# Patient Record
Sex: MALE | Race: WHITE | NOT HISPANIC OR LATINO | Employment: FULL TIME | URBAN - METROPOLITAN AREA
[De-identification: names, ages, dates, MRNs, and addresses within clinical notes are randomized per-mention and may not be internally consistent; named-entity substitution may affect disease eponyms.]

---

## 2017-01-09 ENCOUNTER — DOCUMENTATION ONLY (OUTPATIENT)
Dept: REHABILITATION | Facility: HOSPITAL | Age: 42
End: 2017-01-09

## 2017-01-09 NOTE — PROGRESS NOTES
PHYSICAL THERAPY DISCHARGE SUMMARY     Name: Cayden Reza  Clinic Number: [id number]    Diagnosis: Lumbar pain with radiation down left leg, difficulty walking  Physician: Remy Feng  Treatment Orders: Discharge Summary  Past Medical History   Diagnosis Date    Synovitis of hip      right       Initial visit: 9/26/16  Date of Last visit: 9/26/16  Date of Discharge Note:  1/9/17  Total Visits Received:1  Missed Visits: 3  ASSESSMENT   Status Towards Goals Met:  Patient did not return to physical therapy.  Pt is discharged prior to achieving the goals established in the initial evaluation due to discontinuing physical therapy.     Goals Not achieved and why: see above    Discharge reason : Pt has not re-scheduled further follow-up sessions    PLAN   This patient is discharged from Physical Therapy Services.     Medical necessity is demonstrated by the following IMPAIRMENTS/PROBLEM LIST:  1) Pain limiting function  2) Posture dysfunction  3) Core/Lumbar/LE weakness  4) Decreased thoracic/lumbar joint mobility  5) Decreased Lumbar ROM  6) Decreased soft tissue extensibility/fascia restriction  7) Decreased LE flexibility: HS: left greater than right; decreased sciatic nerve gliding bilaterally left greater than right  8) Lack of HEP  9) LLE paresthesia to left foot  10) pelvic dysfunction: right anterior ilial rotation and left posterior ilial rotation; right sacral rotation     GOALS:   Short Term Goals: 4 weeks  1. Report decreased lumbar pain </= 5/10 at worst to increase tolerance for prolonged standing/community ambulation  2. Pt. to demonstrate proper cervical and scapula retraction requiring min. to no verbal cues from PT  3. Increased MMT for core/lumbar paraspinals to 3+/5 to increase endurance with prolonged sitting/standing.  4. Pt. to demonstrate increased MMT for right gluteus medius to 3/5 to increase stability during community ambulation  5. Increased MMT for hip flexor to 4+/5 to increase  ability to stair negotiation/toe clearance.   6. Pt to tolerate HEP to improve ROM and independence with ADL's  7. Pt. to report a decrease in left LE paresthesia by >/= 50% to improve ability to perform community ambulation     Long Term Goals: 8 weeks  1. Report decreased lumbar pain </= 2/10 at worst to increase tolerance for work related activities  2. Pt. to demonstrate proper cervical and scapula retraction requiring no verbal cues from PT  3. Increase thoracic joint mobility to 3/6 to promote greater ease with self care skills  4. Increase lumbosacral joint mobility to 3-/6 to promote greater ease with prolonged sitting/standing  5. Increased MMT for core/lumbar paraspinals to 4-/5 to increase endurance with prolonged sitting.   6. Pt. to demonstrate increased MMT for right gluteus medius to 4+/5 to increase stability during ambulation on uneven surfaces.  7. Increased MMT for hip flexor to 5/5 to increase tolerance for ADL and work activities.   8. Pt to be independent with HEP to improve ROM and independence with ADL's  9. Pt. to report a decrease in left LE paresthesia by >/= 75% to improve ability to perform work related activities

## 2017-01-30 RX ORDER — MELOXICAM 15 MG/1
TABLET ORAL
Qty: 30 TABLET | Refills: 0 | Status: SHIPPED | OUTPATIENT
Start: 2017-01-30 | End: 2017-02-06 | Stop reason: SDUPTHER

## 2017-02-06 ENCOUNTER — TELEPHONE (OUTPATIENT)
Dept: ORTHOPEDICS | Facility: CLINIC | Age: 42
End: 2017-02-06

## 2017-02-06 DIAGNOSIS — M54.50 ACUTE RIGHT-SIDED LOW BACK PAIN WITHOUT SCIATICA: Primary | ICD-10-CM

## 2017-02-06 RX ORDER — MELOXICAM 15 MG/1
TABLET ORAL
Qty: 30 TABLET | Refills: 0 | Status: SHIPPED | OUTPATIENT
Start: 2017-02-06 | End: 2017-03-03

## 2017-02-06 NOTE — TELEPHONE ENCOUNTER
Pt notified that requested  Mobic RX refill has been ordered per KAT Yan NP. Pt states understanding.

## 2017-02-06 NOTE — TELEPHONE ENCOUNTER
----- Message from Bri Romero sent at 2/6/2017  9:59 AM CST -----  Contact: [facility]@ [phone]  Pt would like a refill for rx meloxicam (MOBIC) 15 MG tablet.

## 2017-02-07 RX ORDER — MELOXICAM 15 MG/1
TABLET ORAL
Qty: 30 TABLET | Refills: 0 | Status: SHIPPED | OUTPATIENT
Start: 2017-02-07 | End: 2017-03-03 | Stop reason: SDUPTHER

## 2017-03-03 RX ORDER — MELOXICAM 15 MG/1
TABLET ORAL
Qty: 30 TABLET | Refills: 3 | Status: SHIPPED | OUTPATIENT
Start: 2017-03-03 | End: 2017-04-04 | Stop reason: SDUPTHER

## 2017-04-02 DIAGNOSIS — M54.50 ACUTE RIGHT-SIDED LOW BACK PAIN WITHOUT SCIATICA: ICD-10-CM

## 2017-04-03 RX ORDER — MELOXICAM 15 MG/1
TABLET ORAL
Qty: 30 TABLET | Refills: 0 | OUTPATIENT
Start: 2017-04-03

## 2017-04-04 RX ORDER — MELOXICAM 15 MG/1
TABLET ORAL
Qty: 30 TABLET | Refills: 3 | Status: SHIPPED | OUTPATIENT
Start: 2017-04-04

## 2017-04-21 RX ORDER — TRAMADOL HYDROCHLORIDE 50 MG/1
50 TABLET ORAL
Qty: 90 TABLET | Refills: 0 | Status: SHIPPED | OUTPATIENT
Start: 2017-04-21 | End: 2017-05-01

## 2017-06-26 RX ORDER — AZITHROMYCIN 250 MG/1
TABLET, FILM COATED ORAL
Qty: 6 TABLET | Refills: 0 | Status: SHIPPED | OUTPATIENT
Start: 2017-06-26 | End: 2017-07-01

## 2020-04-06 ENCOUNTER — PATIENT MESSAGE (OUTPATIENT)
Dept: SPORTS MEDICINE | Facility: CLINIC | Age: 45
End: 2020-04-06

## 2021-04-16 ENCOUNTER — PATIENT MESSAGE (OUTPATIENT)
Dept: RESEARCH | Facility: HOSPITAL | Age: 46
End: 2021-04-16

## 2024-05-16 ENCOUNTER — PATIENT MESSAGE (OUTPATIENT)
Dept: PRIMARY CARE CLINIC | Facility: CLINIC | Age: 49
End: 2024-05-16

## 2024-05-16 ENCOUNTER — PATIENT MESSAGE (OUTPATIENT)
Dept: PRIMARY CARE CLINIC | Facility: CLINIC | Age: 49
End: 2024-05-16
Payer: COMMERCIAL

## 2024-05-16 ENCOUNTER — LAB VISIT (OUTPATIENT)
Dept: LAB | Facility: HOSPITAL | Age: 49
End: 2024-05-16
Attending: STUDENT IN AN ORGANIZED HEALTH CARE EDUCATION/TRAINING PROGRAM
Payer: COMMERCIAL

## 2024-05-16 ENCOUNTER — OFFICE VISIT (OUTPATIENT)
Dept: PRIMARY CARE CLINIC | Facility: CLINIC | Age: 49
End: 2024-05-16
Payer: COMMERCIAL

## 2024-05-16 VITALS
SYSTOLIC BLOOD PRESSURE: 122 MMHG | BODY MASS INDEX: 29.86 KG/M2 | OXYGEN SATURATION: 98 % | HEART RATE: 65 BPM | HEIGHT: 68 IN | DIASTOLIC BLOOD PRESSURE: 80 MMHG | WEIGHT: 197.06 LBS

## 2024-05-16 DIAGNOSIS — Z12.11 ENCOUNTER FOR COLORECTAL CANCER SCREENING: ICD-10-CM

## 2024-05-16 DIAGNOSIS — Z13.1 SCREENING FOR DIABETES MELLITUS: ICD-10-CM

## 2024-05-16 DIAGNOSIS — E34.9 TESTOSTERONE DEFICIENCY: Primary | ICD-10-CM

## 2024-05-16 DIAGNOSIS — Z00.00 ANNUAL PHYSICAL EXAM: Primary | ICD-10-CM

## 2024-05-16 DIAGNOSIS — Z00.00 ANNUAL PHYSICAL EXAM: ICD-10-CM

## 2024-05-16 DIAGNOSIS — Z12.12 ENCOUNTER FOR COLORECTAL CANCER SCREENING: ICD-10-CM

## 2024-05-16 DIAGNOSIS — E78.5 HYPERLIPIDEMIA, UNSPECIFIED HYPERLIPIDEMIA TYPE: ICD-10-CM

## 2024-05-16 DIAGNOSIS — Z12.5 PROSTATE CANCER SCREENING: ICD-10-CM

## 2024-05-16 DIAGNOSIS — K21.9 GASTROESOPHAGEAL REFLUX DISEASE, UNSPECIFIED WHETHER ESOPHAGITIS PRESENT: ICD-10-CM

## 2024-05-16 DIAGNOSIS — I10 HTN (HYPERTENSION), BENIGN: ICD-10-CM

## 2024-05-16 LAB
ALBUMIN SERPL BCP-MCNC: 4.4 G/DL (ref 3.5–5.2)
ALP SERPL-CCNC: 49 U/L (ref 55–135)
ALT SERPL W/O P-5'-P-CCNC: 52 U/L (ref 10–44)
ANION GAP SERPL CALC-SCNC: 7 MMOL/L (ref 8–16)
AST SERPL-CCNC: 26 U/L (ref 10–40)
BASOPHILS # BLD AUTO: 0.06 K/UL (ref 0–0.2)
BASOPHILS NFR BLD: 0.9 % (ref 0–1.9)
BILIRUB SERPL-MCNC: 0.7 MG/DL (ref 0.1–1)
BUN SERPL-MCNC: 21 MG/DL (ref 6–20)
CALCIUM SERPL-MCNC: 10.3 MG/DL (ref 8.7–10.5)
CHLORIDE SERPL-SCNC: 104 MMOL/L (ref 95–110)
CHOLEST SERPL-MCNC: 228 MG/DL (ref 120–199)
CHOLEST/HDLC SERPL: 4.2 {RATIO} (ref 2–5)
CO2 SERPL-SCNC: 28 MMOL/L (ref 23–29)
COMPLEXED PSA SERPL-MCNC: 0.98 NG/ML (ref 0–4)
CREAT SERPL-MCNC: 1.5 MG/DL (ref 0.5–1.4)
DIFFERENTIAL METHOD BLD: ABNORMAL
EOSINOPHIL # BLD AUTO: 0.2 K/UL (ref 0–0.5)
EOSINOPHIL NFR BLD: 2.4 % (ref 0–8)
ERYTHROCYTE [DISTWIDTH] IN BLOOD BY AUTOMATED COUNT: 12.9 % (ref 11.5–14.5)
EST. GFR  (NO RACE VARIABLE): 56.4 ML/MIN/1.73 M^2
ESTIMATED AVG GLUCOSE: 108 MG/DL (ref 68–131)
GLUCOSE SERPL-MCNC: 91 MG/DL (ref 70–110)
HBA1C MFR BLD: 5.4 % (ref 4–5.6)
HCT VFR BLD AUTO: 47 % (ref 40–54)
HCV AB SERPL QL IA: NORMAL
HDLC SERPL-MCNC: 54 MG/DL (ref 40–75)
HDLC SERPL: 23.7 % (ref 20–50)
HGB BLD-MCNC: 15.7 G/DL (ref 14–18)
HIV 1+2 AB+HIV1 P24 AG SERPL QL IA: NORMAL
IMM GRANULOCYTES # BLD AUTO: 0.03 K/UL (ref 0–0.04)
IMM GRANULOCYTES NFR BLD AUTO: 0.4 % (ref 0–0.5)
LDLC SERPL CALC-MCNC: 142.2 MG/DL (ref 63–159)
LYMPHOCYTES # BLD AUTO: 2.7 K/UL (ref 1–4.8)
LYMPHOCYTES NFR BLD: 38.5 % (ref 18–48)
MCH RBC QN AUTO: 32.1 PG (ref 27–31)
MCHC RBC AUTO-ENTMCNC: 33.4 G/DL (ref 32–36)
MCV RBC AUTO: 96 FL (ref 82–98)
MONOCYTES # BLD AUTO: 0.7 K/UL (ref 0.3–1)
MONOCYTES NFR BLD: 9.4 % (ref 4–15)
NEUTROPHILS # BLD AUTO: 3.4 K/UL (ref 1.8–7.7)
NEUTROPHILS NFR BLD: 48.4 % (ref 38–73)
NONHDLC SERPL-MCNC: 174 MG/DL
NRBC BLD-RTO: 0 /100 WBC
PLATELET # BLD AUTO: 218 K/UL (ref 150–450)
PMV BLD AUTO: 10.5 FL (ref 9.2–12.9)
POTASSIUM SERPL-SCNC: 4.6 MMOL/L (ref 3.5–5.1)
PROT SERPL-MCNC: 7.8 G/DL (ref 6–8.4)
RBC # BLD AUTO: 4.89 M/UL (ref 4.6–6.2)
SODIUM SERPL-SCNC: 139 MMOL/L (ref 136–145)
TRIGL SERPL-MCNC: 159 MG/DL (ref 30–150)
TSH SERPL DL<=0.005 MIU/L-ACNC: 1.08 UIU/ML (ref 0.4–4)
WBC # BLD AUTO: 7.01 K/UL (ref 3.9–12.7)

## 2024-05-16 PROCEDURE — 87389 HIV-1 AG W/HIV-1&-2 AB AG IA: CPT | Performed by: STUDENT IN AN ORGANIZED HEALTH CARE EDUCATION/TRAINING PROGRAM

## 2024-05-16 PROCEDURE — 3079F DIAST BP 80-89 MM HG: CPT | Mod: CPTII,S$GLB,, | Performed by: STUDENT IN AN ORGANIZED HEALTH CARE EDUCATION/TRAINING PROGRAM

## 2024-05-16 PROCEDURE — 36415 COLL VENOUS BLD VENIPUNCTURE: CPT | Performed by: STUDENT IN AN ORGANIZED HEALTH CARE EDUCATION/TRAINING PROGRAM

## 2024-05-16 PROCEDURE — 3074F SYST BP LT 130 MM HG: CPT | Mod: CPTII,S$GLB,, | Performed by: STUDENT IN AN ORGANIZED HEALTH CARE EDUCATION/TRAINING PROGRAM

## 2024-05-16 PROCEDURE — 80061 LIPID PANEL: CPT | Performed by: STUDENT IN AN ORGANIZED HEALTH CARE EDUCATION/TRAINING PROGRAM

## 2024-05-16 PROCEDURE — 80053 COMPREHEN METABOLIC PANEL: CPT | Performed by: STUDENT IN AN ORGANIZED HEALTH CARE EDUCATION/TRAINING PROGRAM

## 2024-05-16 PROCEDURE — 3008F BODY MASS INDEX DOCD: CPT | Mod: CPTII,S$GLB,, | Performed by: STUDENT IN AN ORGANIZED HEALTH CARE EDUCATION/TRAINING PROGRAM

## 2024-05-16 PROCEDURE — 1159F MED LIST DOCD IN RCRD: CPT | Mod: CPTII,S$GLB,, | Performed by: STUDENT IN AN ORGANIZED HEALTH CARE EDUCATION/TRAINING PROGRAM

## 2024-05-16 PROCEDURE — 86803 HEPATITIS C AB TEST: CPT | Performed by: STUDENT IN AN ORGANIZED HEALTH CARE EDUCATION/TRAINING PROGRAM

## 2024-05-16 PROCEDURE — 83036 HEMOGLOBIN GLYCOSYLATED A1C: CPT | Performed by: STUDENT IN AN ORGANIZED HEALTH CARE EDUCATION/TRAINING PROGRAM

## 2024-05-16 PROCEDURE — 1160F RVW MEDS BY RX/DR IN RCRD: CPT | Mod: CPTII,S$GLB,, | Performed by: STUDENT IN AN ORGANIZED HEALTH CARE EDUCATION/TRAINING PROGRAM

## 2024-05-16 PROCEDURE — 85025 COMPLETE CBC W/AUTO DIFF WBC: CPT | Performed by: STUDENT IN AN ORGANIZED HEALTH CARE EDUCATION/TRAINING PROGRAM

## 2024-05-16 PROCEDURE — 84443 ASSAY THYROID STIM HORMONE: CPT | Performed by: STUDENT IN AN ORGANIZED HEALTH CARE EDUCATION/TRAINING PROGRAM

## 2024-05-16 PROCEDURE — 4010F ACE/ARB THERAPY RXD/TAKEN: CPT | Mod: CPTII,S$GLB,, | Performed by: STUDENT IN AN ORGANIZED HEALTH CARE EDUCATION/TRAINING PROGRAM

## 2024-05-16 PROCEDURE — 99999 PR PBB SHADOW E&M-EST. PATIENT-LVL V: CPT | Mod: PBBFAC,,, | Performed by: STUDENT IN AN ORGANIZED HEALTH CARE EDUCATION/TRAINING PROGRAM

## 2024-05-16 PROCEDURE — 99386 PREV VISIT NEW AGE 40-64: CPT | Mod: S$GLB,,, | Performed by: STUDENT IN AN ORGANIZED HEALTH CARE EDUCATION/TRAINING PROGRAM

## 2024-05-16 PROCEDURE — 3044F HG A1C LEVEL LT 7.0%: CPT | Mod: CPTII,S$GLB,, | Performed by: STUDENT IN AN ORGANIZED HEALTH CARE EDUCATION/TRAINING PROGRAM

## 2024-05-16 PROCEDURE — 84153 ASSAY OF PSA TOTAL: CPT | Performed by: STUDENT IN AN ORGANIZED HEALTH CARE EDUCATION/TRAINING PROGRAM

## 2024-05-16 RX ORDER — ROSUVASTATIN CALCIUM 10 MG/1
1 TABLET, COATED ORAL DAILY
COMMUNITY

## 2024-05-16 RX ORDER — BENAZEPRIL HYDROCHLORIDE 40 MG/1
40 TABLET ORAL DAILY
COMMUNITY
Start: 2024-01-27 | End: 2024-06-19 | Stop reason: SDUPTHER

## 2024-05-16 NOTE — TELEPHONE ENCOUNTER
Pt was seen in clinic today and states he didn't emntion he is on testosterone supplement. Pt would like this checked

## 2024-05-16 NOTE — PROGRESS NOTES
SUBJECTIVE     Chief Complaint   Patient presents with    Establish Care       HPI  Cayden Reza is a 49 y.o. male with medical diagnoses as listed in the medical history and problem list that presents for annual exam. Pt is establishing care with me today.    Pt is not UTD on age appropriate CA screening. Due for PSA.    Family, social, surgical Hx reviewed     HTN: benazepril 40 mg daily, out of meds for 10 days. BP in clinic today is 122/80.     HLD: Crestor 10 mg daily, tolerating without issues.     GERD: Omeprazole prn reflux. No blood in stool.       Health Maintenance         Date Due Completion Date    TETANUS VACCINE Never done ---    Colorectal Cancer Screening Never done ---    COVID-19 Vaccine ( season) Never done ---    Shingles Vaccine (1 of 2) Never done ---    Influenza Vaccine (Season Ended) 2024 ---    Hemoglobin A1c (Diabetic Prevention Screening) 2027    Lipid Panel 2029              PAST MEDICAL HISTORY:  Past Medical History:   Diagnosis Date    HTN (hypertension), benign 2024    Synovitis of hip     right       PAST SURGICAL HISTORY:  Past Surgical History:   Procedure Laterality Date    HIP SURGERY      bilateral hip       SOCIAL HISTORY:  Social History     Socioeconomic History    Marital status:    Tobacco Use    Smoking status: Former     Current packs/day: 0.00     Types: Cigarettes     Quit date: 1996     Years since quittin.9    Smokeless tobacco: Never   Substance and Sexual Activity    Alcohol use: Yes     Comment: socially    Drug use: No       FAMILY HISTORY:  Family History   Problem Relation Name Age of Onset    No Known Problems Mother      Prostate cancer Father      Ulcerative colitis Sister      Transient ischemic attack Maternal Grandmother      Pacemaker/defibrilator Maternal Grandfather      Heart disease Paternal Grandfather      Prostate cancer Maternal Uncle      Stomach cancer Neg Hx       "Esophageal cancer Neg Hx      Irritable bowel syndrome Neg Hx      Crohn's disease Neg Hx      Celiac disease Neg Hx         ALLERGIES AND MEDICATIONS: updated and reviewed.  Review of patient's allergies indicates:   Allergen Reactions    Adhesive Other (See Comments)     BURNING    Amoxicillin Nausea And Vomiting    Morphine Other (See Comments)    Oxycodone-acetaminophen Other (See Comments)     Current Outpatient Medications   Medication Sig Dispense Refill    benazepriL (LOTENSIN) 40 MG tablet Take 40 mg by mouth once daily.      rosuvastatin (CRESTOR) 10 MG tablet Take 1 tablet by mouth once daily.       No current facility-administered medications for this visit.       ROS  Review of Systems   Constitutional:  Negative for fever and weight loss.   Respiratory:  Negative for cough and shortness of breath.    Cardiovascular:  Negative for chest pain and palpitations.   Gastrointestinal:  Negative for abdominal pain, constipation, diarrhea, nausea and vomiting.   Genitourinary:  Negative for dysuria.   Musculoskeletal:  Negative for back pain and joint pain.   Skin:  Negative for rash.   Neurological:  Negative for dizziness, weakness and headaches.   Psychiatric/Behavioral:  Negative for depression. The patient is not nervous/anxious.            OBJECTIVE     Physical Exam  Vitals:    05/16/24 0852   BP: 122/80   Pulse: 65    Body mass index is 29.97 kg/m².  Weight: 89.4 kg (197 lb 1.5 oz)   Height: 5' 8" (172.7 cm)     Physical Exam  HENT:      Head: Normocephalic and atraumatic.      Nose: Nose normal.      Mouth/Throat:      Mouth: Mucous membranes are moist.      Pharynx: Oropharynx is clear.   Eyes:      Extraocular Movements: Extraocular movements intact.      Conjunctiva/sclera: Conjunctivae normal.      Pupils: Pupils are equal, round, and reactive to light.   Pulmonary:      Effort: Pulmonary effort is normal.   Musculoskeletal:         General: No swelling. Normal range of motion.      Cervical back: " Normal range of motion.      Right lower leg: No edema.      Left lower leg: No edema.   Skin:     General: Skin is warm.      Findings: No lesion or rash.   Neurological:      General: No focal deficit present.      Mental Status: He is alert and oriented to person, place, and time.      Motor: No weakness.               ASSESSMENT     49 y.o. male with     1. Annual physical exam    2. HTN (hypertension), benign    3. Prostate cancer screening    4. Screening for diabetes mellitus    5. Hyperlipidemia, unspecified hyperlipidemia type    6. Encounter for colorectal cancer screening    7. Gastroesophageal reflux disease, unspecified whether esophagitis present        PLAN:     1. Annual physical exam  -     HIV 1/2 Ag/Ab (4th Gen); Future; Expected date: 05/16/2024  -     Hepatitis C Antibody; Future; Expected date: 05/16/2024  -     TSH; Future; Expected date: 05/16/2024  -     Lipid Panel; Future; Expected date: 05/16/2024  -     Comprehensive Metabolic Panel; Future; Expected date: 05/16/2024  -     CBC Auto Differential; Future; Expected date: 05/16/2024    2. HTN (hypertension), benign  Stable on medications, continue regimen    3. Prostate cancer screening  -     PSA, Screening; Future; Expected date: 05/16/2024    4. Screening for diabetes mellitus  -     Hemoglobin A1C; Future; Expected date: 05/16/2024    5. Hyperlipidemia, unspecified hyperlipidemia type  Stable on medications, continue regimen    6. Encounter for colorectal cancer screening  -     Ambulatory referral/consult to Endo Procedure ; Future; Expected date: 05/17/2024    7. Gastroesophageal reflux disease, unspecified whether esophagitis present  -     Ambulatory referral/consult to Gastroenterology; Future; Expected date: 05/23/2024        Discussed age and gender appropriate screenings at this visit and encouraged a healthy diet low in simple carbohydrates, and increased physical activity.  Counseled on medically appropriate vaccines  based on age and current health condition.  Screening test reviewed and discussed with patient.      RTC in 1 year     Leanne Stanley MD

## 2024-05-16 NOTE — PATIENT INSTRUCTIONS
Endoscopy  Phone Number: [phone] (Call and leave a voicemail with name, phone number, and date of birth)

## 2024-05-17 DIAGNOSIS — E34.9 TESTOSTERONE INSUFFICIENCY: Primary | ICD-10-CM

## 2024-05-23 ENCOUNTER — LAB VISIT (OUTPATIENT)
Dept: LAB | Facility: HOSPITAL | Age: 49
End: 2024-05-23
Attending: STUDENT IN AN ORGANIZED HEALTH CARE EDUCATION/TRAINING PROGRAM
Payer: COMMERCIAL

## 2024-05-23 DIAGNOSIS — E34.9 TESTOSTERONE INSUFFICIENCY: ICD-10-CM

## 2024-05-23 LAB — TESTOST SERPL-MCNC: 510 NG/DL (ref 304–1227)

## 2024-05-23 PROCEDURE — 84403 ASSAY OF TOTAL TESTOSTERONE: CPT | Performed by: STUDENT IN AN ORGANIZED HEALTH CARE EDUCATION/TRAINING PROGRAM

## 2024-05-23 PROCEDURE — 84402 ASSAY OF FREE TESTOSTERONE: CPT | Performed by: STUDENT IN AN ORGANIZED HEALTH CARE EDUCATION/TRAINING PROGRAM

## 2024-05-23 PROCEDURE — 36415 COLL VENOUS BLD VENIPUNCTURE: CPT | Performed by: STUDENT IN AN ORGANIZED HEALTH CARE EDUCATION/TRAINING PROGRAM

## 2024-05-28 LAB — TESTOST FREE SERPL-MCNC: >100 PG/ML (ref 5.1–41.5)

## 2024-06-14 ENCOUNTER — PATIENT MESSAGE (OUTPATIENT)
Dept: PRIMARY CARE CLINIC | Facility: CLINIC | Age: 49
End: 2024-06-14
Payer: COMMERCIAL

## 2024-06-14 DIAGNOSIS — I10 HTN (HYPERTENSION), BENIGN: Primary | ICD-10-CM

## 2024-06-19 RX ORDER — BENAZEPRIL HYDROCHLORIDE 40 MG/1
40 TABLET ORAL DAILY
Qty: 90 TABLET | Refills: 3 | Status: SHIPPED | OUTPATIENT
Start: 2024-06-19

## 2024-06-19 NOTE — TELEPHONE ENCOUNTER
Pt requesting refill on Belazepril    Med pended for your review   Principal Discharge DX:	Neck pain

## 2024-06-19 NOTE — TELEPHONE ENCOUNTER
No care due was identified.  [facility] Embedded Care Due Messages. Reference number: [id number].   6/19/2024 4:39:17 PM CDT

## 2024-07-12 ENCOUNTER — PATIENT MESSAGE (OUTPATIENT)
Dept: PRIMARY CARE CLINIC | Facility: CLINIC | Age: 49
End: 2024-07-12
Payer: COMMERCIAL

## 2024-07-12 DIAGNOSIS — E78.5 HYPERLIPIDEMIA, UNSPECIFIED HYPERLIPIDEMIA TYPE: Primary | ICD-10-CM

## 2024-07-12 RX ORDER — ROSUVASTATIN CALCIUM 10 MG/1
10 TABLET, COATED ORAL DAILY
Qty: 90 TABLET | Refills: 3 | Status: SHIPPED | OUTPATIENT
Start: 2024-07-12

## 2024-07-12 NOTE — TELEPHONE ENCOUNTER
No care due was identified.  [facility] Embedded Care Due Messages. Reference number: [id number].   7/12/2024 12:45:21 PM CDT

## 2024-07-12 NOTE — TELEPHONE ENCOUNTER
Refill Routing Note   Medication(s) are not appropriate for processing by [facility] for the following reason(s):        No active prescription written by provider:     [facility] action(s):  Defer      Medication Therapy Plan:         Appointments  past 12m or future 3m with PCP    Date Provider   Last Visit   5/16/2024 Leanne Stanley MD   Next Visit   7/29/2024 Leanne Stanley MD   ED visits in past 90 days: 0        Note composed:12:46 PM 07/12/2024

## 2024-07-15 ENCOUNTER — CLINICAL SUPPORT (OUTPATIENT)
Dept: ENDOSCOPY | Facility: HOSPITAL | Age: 49
End: 2024-07-15
Attending: STUDENT IN AN ORGANIZED HEALTH CARE EDUCATION/TRAINING PROGRAM

## 2024-07-15 DIAGNOSIS — Z12.11 ENCOUNTER FOR COLORECTAL CANCER SCREENING: ICD-10-CM

## 2024-07-15 DIAGNOSIS — Z12.12 ENCOUNTER FOR COLORECTAL CANCER SCREENING: ICD-10-CM

## 2024-07-15 NOTE — PLAN OF CARE
Spoke to pt. To schedule. He stated he is changing jobs and not sure when he will be covered by new Insurance. Scheduling phone number provided and he stated he will contact us back when he is ready. My O msg. Sent as well.

## 2024-09-13 ENCOUNTER — HOSPITAL ENCOUNTER (EMERGENCY)
Facility: HOSPITAL | Age: 49
Discharge: HOME | End: 2024-09-13
Attending: EMERGENCY MEDICINE
Payer: COMMERCIAL

## 2024-09-13 VITALS
RESPIRATION RATE: 16 BRPM | DIASTOLIC BLOOD PRESSURE: 86 MMHG | WEIGHT: 195 LBS | TEMPERATURE: 99 F | HEART RATE: 69 BPM | HEIGHT: 68 IN | OXYGEN SATURATION: 99 % | SYSTOLIC BLOOD PRESSURE: 118 MMHG | BODY MASS INDEX: 29.55 KG/M2

## 2024-09-13 DIAGNOSIS — K21.9 GASTROESOPHAGEAL REFLUX DISEASE, UNSPECIFIED WHETHER ESOPHAGITIS PRESENT: ICD-10-CM

## 2024-09-13 DIAGNOSIS — R07.9 CHEST PAIN: Primary | ICD-10-CM

## 2024-09-13 LAB
ALBUMIN SERPL BCP-MCNC: 4.4 G/DL (ref 3.5–5.2)
ALP SERPL-CCNC: 51 U/L (ref 55–135)
ALT SERPL W/O P-5'-P-CCNC: 31 U/L (ref 10–44)
ANION GAP SERPL CALC-SCNC: 9 MMOL/L (ref 8–16)
AST SERPL-CCNC: 18 U/L (ref 10–40)
BASOPHILS # BLD AUTO: 0.05 K/UL (ref 0–0.2)
BASOPHILS NFR BLD: 0.7 % (ref 0–1.9)
BILIRUB SERPL-MCNC: 0.5 MG/DL (ref 0.1–1)
BNP SERPL-MCNC: 10 PG/ML (ref 0–99)
BUN SERPL-MCNC: 21 MG/DL (ref 6–20)
CALCIUM SERPL-MCNC: 10.2 MG/DL (ref 8.7–10.5)
CHLORIDE SERPL-SCNC: 104 MMOL/L (ref 95–110)
CO2 SERPL-SCNC: 26 MMOL/L (ref 23–29)
CREAT SERPL-MCNC: 1.3 MG/DL (ref 0.5–1.4)
DIFFERENTIAL METHOD BLD: ABNORMAL
EOSINOPHIL # BLD AUTO: 0.1 K/UL (ref 0–0.5)
EOSINOPHIL NFR BLD: 0.8 % (ref 0–8)
ERYTHROCYTE [DISTWIDTH] IN BLOOD BY AUTOMATED COUNT: 12.1 % (ref 11.5–14.5)
EST. GFR  (NO RACE VARIABLE): >60 ML/MIN/1.73 M^2
GLUCOSE SERPL-MCNC: 98 MG/DL (ref 70–110)
HCT VFR BLD AUTO: 46.5 % (ref 40–54)
HGB BLD-MCNC: 15.5 G/DL (ref 14–18)
IMM GRANULOCYTES # BLD AUTO: 0.03 K/UL (ref 0–0.04)
IMM GRANULOCYTES NFR BLD AUTO: 0.4 % (ref 0–0.5)
LYMPHOCYTES # BLD AUTO: 2 K/UL (ref 1–4.8)
LYMPHOCYTES NFR BLD: 27.3 % (ref 18–48)
MCH RBC QN AUTO: 31.8 PG (ref 27–31)
MCHC RBC AUTO-ENTMCNC: 33.3 G/DL (ref 32–36)
MCV RBC AUTO: 96 FL (ref 82–98)
MONOCYTES # BLD AUTO: 0.6 K/UL (ref 0.3–1)
MONOCYTES NFR BLD: 8.6 % (ref 4–15)
NEUTROPHILS # BLD AUTO: 4.6 K/UL (ref 1.8–7.7)
NEUTROPHILS NFR BLD: 62.2 % (ref 38–73)
NRBC BLD-RTO: 0 /100 WBC
OHS QRS DURATION: 90 MS
OHS QTC CALCULATION: 412 MS
PLATELET # BLD AUTO: 246 K/UL (ref 150–450)
PMV BLD AUTO: 9.9 FL (ref 9.2–12.9)
POTASSIUM SERPL-SCNC: 4.6 MMOL/L (ref 3.5–5.1)
PROT SERPL-MCNC: 7.7 G/DL (ref 6–8.4)
RBC # BLD AUTO: 4.87 M/UL (ref 4.6–6.2)
SODIUM SERPL-SCNC: 139 MMOL/L (ref 136–145)
TROPONIN I SERPL DL<=0.01 NG/ML-MCNC: <0.006 NG/ML (ref 0–0.03)
TROPONIN I SERPL DL<=0.01 NG/ML-MCNC: <0.006 NG/ML (ref 0–0.03)
WBC # BLD AUTO: 7.32 K/UL (ref 3.9–12.7)

## 2024-09-13 PROCEDURE — 25000003 PHARM REV CODE 250: Performed by: EMERGENCY MEDICINE

## 2024-09-13 PROCEDURE — 83880 ASSAY OF NATRIURETIC PEPTIDE: CPT | Performed by: EMERGENCY MEDICINE

## 2024-09-13 PROCEDURE — 93005 ELECTROCARDIOGRAM TRACING: CPT

## 2024-09-13 PROCEDURE — 80053 COMPREHEN METABOLIC PANEL: CPT | Performed by: EMERGENCY MEDICINE

## 2024-09-13 PROCEDURE — 85025 COMPLETE CBC W/AUTO DIFF WBC: CPT | Performed by: EMERGENCY MEDICINE

## 2024-09-13 PROCEDURE — 84484 ASSAY OF TROPONIN QUANT: CPT | Mod: 91 | Performed by: EMERGENCY MEDICINE

## 2024-09-13 PROCEDURE — 93010 ELECTROCARDIOGRAM REPORT: CPT | Mod: ,,, | Performed by: INTERNAL MEDICINE

## 2024-09-13 PROCEDURE — 99285 EMERGENCY DEPT VISIT HI MDM: CPT | Mod: 25

## 2024-09-13 RX ORDER — NAPROXEN SODIUM 220 MG/1
243 TABLET, FILM COATED ORAL
Status: COMPLETED | OUTPATIENT
Start: 2024-09-13 | End: 2024-09-13

## 2024-09-13 RX ORDER — OMEPRAZOLE 20 MG/1
20 CAPSULE, DELAYED RELEASE ORAL DAILY
Qty: 30 CAPSULE | Refills: 0 | Status: SHIPPED | OUTPATIENT
Start: 2024-09-13 | End: 2024-09-20 | Stop reason: ALTCHOICE

## 2024-09-13 RX ADMIN — ASPIRIN 81 MG CHEWABLE TABLET 243 MG: 81 TABLET CHEWABLE at 11:09

## 2024-09-13 NOTE — ED PROVIDER NOTES
Encounter Date: 9/13/2024       History     Chief Complaint   Patient presents with    Chest Pain     Started this morning tightness in his mid chest and pain to right shoulder radiating to left shoulder    Shortness of Breath     HPI  49-year-old male who presents with chest pain.  Started this morning at around 5:45 a.m..  Did not wake him from sleep.  Was located in the left chest.  Some radiation to the left shoulder and then to the right chest.  Nothing seemed to make it better or worse.  No nausea or vomiting or diaphoresis.  Currently he was having very minimal tightness in the left chest and just feels fatigued.  No weakness or numbness in his extremities.  Reports some mild shortness of breath.  No coughing or fevers.  No leg swelling.  He has a history of hypertension but otherwise no medical problems.  He believes that either his father was brother may have had a heart attack.  He was not a smoker.  He does state that he had a very similar episode a few years ago and he had a full workup including a stress test and was told that it was likely reflux and he would have to have an endoscopy.  Of note, the patient took 1 baby aspirin this morning.      Review of patient's allergies indicates:   Allergen Reactions    Adhesive Other (See Comments)     BURNING    Amoxicillin Nausea And Vomiting    Gabapentin Other (See Comments)     Blurry vision    Morphine Other (See Comments)    Oxycodone-acetaminophen Other (See Comments)     Past Medical History:   Diagnosis Date    HTN (hypertension), benign 5/16/2024    Synovitis of hip     right     Past Surgical History:   Procedure Laterality Date    HIP SURGERY      bilateral hip     Family History   Problem Relation Name Age of Onset    No Known Problems Mother      Prostate cancer Father      Ulcerative colitis Sister      Transient ischemic attack Maternal Grandmother      Pacemaker/defibrilator Maternal Grandfather      Heart disease Paternal Grandfather       Prostate cancer Maternal Uncle      Stomach cancer Neg Hx      Esophageal cancer Neg Hx      Irritable bowel syndrome Neg Hx      Crohn's disease Neg Hx      Celiac disease Neg Hx       Social History     Tobacco Use    Smoking status: Former     Current packs/day: 0.00     Types: Cigarettes     Quit date: 1996     Years since quittin.2    Smokeless tobacco: Never   Substance Use Topics    Alcohol use: Yes     Comment: socially    Drug use: No     Review of Systems    Physical Exam     Initial Vitals [24 1104]   BP Pulse Resp Temp SpO2   (!) 153/99 78 16 97.6 °F (36.4 °C) 98 %      MAP       --         Physical Exam    Nursing note and vitals reviewed.  Constitutional: He appears well-developed and well-nourished. No distress.   HENT:   Head: Normocephalic and atraumatic.   Nose: Nose normal.   Eyes: Conjunctivae and EOM are normal. Pupils are equal, round, and reactive to light.   Neck:   Normal range of motion.  Cardiovascular:  Normal rate, regular rhythm, normal heart sounds and intact distal pulses.     Exam reveals no gallop and no friction rub.       No murmur heard.  Pulmonary/Chest: Breath sounds normal. No respiratory distress. He has no wheezes. He has no rhonchi. He has no rales.   Abdominal: Abdomen is soft. Bowel sounds are normal. He exhibits no distension. There is no abdominal tenderness. There is no rebound and no guarding.   Musculoskeletal:         General: No tenderness or edema. Normal range of motion.      Cervical back: Normal range of motion.     Neurological: He is alert and oriented to person, place, and time. He has normal strength. He displays normal reflexes. No sensory deficit.   Skin: Skin is warm and dry. Capillary refill takes less than 2 seconds.   Psychiatric: He has a normal mood and affect.         ED Course   Procedures  Labs Reviewed   CBC W/ AUTO DIFFERENTIAL - Abnormal       Result Value    WBC 7.32      RBC 4.87      Hemoglobin 15.5      Hematocrit 46.5       MCV 96      MCH 31.8 (*)     MCHC 33.3      RDW 12.1      Platelets 246      MPV 9.9      Immature Granulocytes 0.4      Gran # (ANC) 4.6      Immature Grans (Abs) 0.03      Lymph # 2.0      Mono # 0.6      Eos # 0.1      Baso # 0.05      nRBC 0      Gran % 62.2      Lymph % 27.3      Mono % 8.6      Eosinophil % 0.8      Basophil % 0.7      Differential Method Automated     COMPREHENSIVE METABOLIC PANEL - Abnormal    Sodium 139      Potassium 4.6      Chloride 104      CO2 26      Glucose 98      BUN 21 (*)     Creatinine 1.3      Calcium 10.2      Total Protein 7.7      Albumin 4.4      Total Bilirubin 0.5      Alkaline Phosphatase 51 (*)     AST 18      ALT 31      eGFR >60.0      Anion Gap 9     TROPONIN I    Troponin I <0.006     TROPONIN I    Troponin I <0.006     B-TYPE NATRIURETIC PEPTIDE    BNP 10          ECG Results              EKG 12-lead (Final result)        Collection Time Result Time QRS Duration [facility] QTC Calculation    09/13/24 11:05:14 09/13/24 13:48:56 90 412                     Final result by Interface, Lab In [facility] (09/13/24 13:49:00)                   Narrative:    Test Reason : R07.9,    Vent. Rate : 078 BPM     Atrial Rate : 078 BPM     P-R Int : 168 ms          QRS Dur : 090 ms      QT Int : 362 ms       P-R-T Axes : 031 -10 011 degrees     QTc Int : 412 ms    Normal sinus rhythm  Nonspecific ST and T wave abnormality  Abnormal ECG  No previous ECGs available  Confirmed by AUBREY KNOWLES MD ([id number]) on 9/13/2024 1:48:53 PM    Referred By: SHAVONNE   SELF           Confirmed By:AUBREY KNOWLES MD                                  Imaging Results              X-Ray Chest PA And Lateral (Final result)  Result time 09/13/24 11:56:33      Final result by Jayden Jain MD (09/13/24 11:56:33)                   Impression:      See above      Electronically signed by: Jayden Jain MD  Date:    09/13/2024  Time:    11:56               Narrative:    EXAMINATION:  XR CHEST PA AND  LATERAL    CLINICAL HISTORY:  Chest Pain;    TECHNIQUE:  PA and lateral views of the chest were performed.    COMPARISON:  None    FINDINGS:  Heart size normal.  The lungs are clear.  No pleural effusion                                       Medications   aspirin chewable tablet 243 mg (243 mg Oral Given 9/13/24 1151)     Medical Decision Making  49-year-old male who presents with chest pain.  Only significant history is hypertension though has had similar symptoms in the past with a normal workup.  Here, he is well-appearing.  He was mildly hypertensive to 150/99.  Otherwise he has a normal exam.  EKG without any acute ischemia on my read.  Based on history, certainly we will need to rule out ACS.  Low suspicion for PE, dissection, pericarditis, pneumonia, pancreatitis, cholecystitis.  Certainly could be GERD.  Heart score would be a 342 risk factors, he age, and a moderately suspicious history.    Workup benign.  Stable for discharge.  Return precautions given.    Amount and/or Complexity of Data Reviewed  Labs: ordered.  Radiology: ordered.  ECG/medicine tests: ordered and independent interpretation performed.     Details: Sinus, regular, rate 80, normal intervals, normal ST segments.  Disagree with inferior infarct.  There are some artifactual flutter waves in lead 2 and 3 and AVF but, again, likely artifact.    Risk  OTC drugs.  Prescription drug management.                                      Clinical Impression:  Final diagnoses:  [R07.9] Chest pain (Primary)  [K21.9] Gastroesophageal reflux disease, unspecified whether esophagitis present          ED Disposition Condition    Discharge Stable          ED Prescriptions       Medication Sig Dispense Start Date End Date Auth. Provider    omeprazole (PRILOSEC) 20 MG capsule Take 1 capsule (20 mg total) by mouth once daily. 30 capsule 9/13/2024 10/13/2024 Aracely Mcintosh MD          Follow-up Information    None          Aracely Mcintosh MD  09/13/24 3282

## 2024-09-13 NOTE — ED NOTES
Patient identifiers verified and correct for Cam Alessiavally.  LOC: The patient is awake, alert and aware of environment with an appropriate affect, the patient is oriented x 3 and speaking appropriately.   APPEARANCE: Patient appears comfortable and in no acute distress, patient is clean and well groomed.  SKIN: The skin is warm and dry, color consistent with ethnicity, patient has normal skin turgor and moist mucus membranes, skin intact, no breakdown or bruising noted.   MUSCULOSKELETAL: Patient moving all extremities spontaneously, no swelling noted.  RESPIRATORY: Airway is open and patent, respirations are spontaneous, patient has a normal effort and rate, no accessory muscle use noted, O2 sats noted at 98% on room air.  CARDIAC: Patient has a normal rate and regular rhythm, no edema noted, capillary refill < 3 seconds. Mild chest tightness running into left arm.  GASTRO: Soft and non tender to palpation, no distention noted, normoactive bowel sounds present in all four quadrants. Pt states bowel movements have been regular.  : Pt denies any pain or frequency with urination.  NEURO: Pt opens eyes spontaneously, behavior appropriate to situation, follows commands, facial expression symmetrical, bilateral hand grasp equal and even, purposeful motor response noted, normal sensation in all extremities when touched with a finger.

## 2024-09-13 NOTE — ED TRIAGE NOTES
Pt reports chest pain and tightness going into left shoulder. SOB has resolved. Pt had similar episode about 5 years ago. Hx HTN.

## 2024-09-17 ENCOUNTER — TELEPHONE (OUTPATIENT)
Dept: GASTROENTEROLOGY | Facility: CLINIC | Age: 49
End: 2024-09-17
Payer: COMMERCIAL

## 2024-09-17 NOTE — TELEPHONE ENCOUNTER
MA Attempted to call patient for scheduling, no answer, no voicemail available, MA Sent My chart message.

## 2024-09-18 ENCOUNTER — OFFICE VISIT (OUTPATIENT)
Dept: URGENT CARE | Facility: CLINIC | Age: 49
End: 2024-09-18
Payer: COMMERCIAL

## 2024-09-18 VITALS
TEMPERATURE: 98 F | RESPIRATION RATE: 18 BRPM | DIASTOLIC BLOOD PRESSURE: 77 MMHG | HEART RATE: 62 BPM | WEIGHT: 195 LBS | OXYGEN SATURATION: 98 % | BODY MASS INDEX: 29.55 KG/M2 | HEIGHT: 68 IN | SYSTOLIC BLOOD PRESSURE: 114 MMHG

## 2024-09-18 DIAGNOSIS — T78.40XA ALLERGIC REACTION, INITIAL ENCOUNTER: Primary | ICD-10-CM

## 2024-09-18 PROCEDURE — 99213 OFFICE O/P EST LOW 20 MIN: CPT | Mod: S$GLB,,, | Performed by: FAMILY MEDICINE

## 2024-09-18 RX ORDER — MOMETASONE FUROATE 1 MG/G
CREAM TOPICAL DAILY
Qty: 45 G | Refills: 0 | Status: SHIPPED | OUTPATIENT
Start: 2024-09-18

## 2024-09-18 NOTE — PROGRESS NOTES
"Subjective:      Patient ID: Cayden Reza is a 49 y.o. male.    Vitals:  height is 5' 8" (1.727 m) and weight is 88.5 kg (195 lb). His temperature is 97.5 °F (36.4 °C). His blood pressure is 114/77 and his pulse is 62. His respiration is 18 and oxygen saturation is 98%.     Chief Complaint: Rash    This is a 49 y.o. male who presents today with a chief complaint of allergic reaction after getting an EKG onset 4-5 days ago. Pt went to the Er with complaint of chest pains on Friday and after the EKG was done patient went home and realized he had redness, swelling, and pain where each electrode was placed. Pt states the rashes got worst as the days went on and began to have purulent discharge on each rash. Pt also states it is very itchy and painful. The rashes are now dry, hard, itchy, red, and swollen     Allergic Reaction  This is a new problem. The current episode started 3 to 5 days ago. The problem has been gradually worsening since onset. The problem is severe. Associated with: adhesive. The time of exposure was just prior to onset. Associated symptoms include itching, a rash and skin blistering. Pertinent negatives include no abdominal pain, chest pain, chest pressure, coughing, diarrhea, difficulty breathing, drooling, eye itching, eye redness, eye watering, globus sensation, hyperventilation, stridor, trouble swallowing, vomiting or wheezing. His rash is characterized by: raised and pruritic.Past treatments include nothing. There is no history of asthma, food allergies, medication allergies or seasonal allergies. Swelling location: chest and abdomen.       HENT:  Negative for drooling and trouble swallowing.    Cardiovascular:  Negative for chest pain.   Eyes:  Negative for eye itching and eye redness.   Respiratory:  Negative for cough, stridor and wheezing.    Gastrointestinal:  Negative for abdominal pain, vomiting and diarrhea.   Skin:  Positive for rash and erythema.   Allergic/Immunologic: " Negative for seasonal allergies and food allergies.      Objective:     Physical Exam   Constitutional: He does not appear ill. No distress. normal  Neck: Neck supple.   Cardiovascular: Normal rate, regular rhythm, normal heart sounds and normal pulses.   Pulmonary/Chest: Effort normal and breath sounds normal.   Abdominal: Normal appearance.   Neurological: He is alert.   Skin: Skin is rash (raised papular and hives noted on left precordium and bilateral hips). erythema and lesion   Nursing note and vitals reviewed.    Assessment:     1. Allergic reaction, initial encounter        Plan:       Allergic reaction, initial encounter  -     mometasone 0.1% (ELOCON) 0.1 % cream; Apply topically once daily.  Dispense: 45 g; Refill: 0    Discussed skin care, RTC prn worsening symptoms

## 2024-09-20 ENCOUNTER — OFFICE VISIT (OUTPATIENT)
Dept: GASTROENTEROLOGY | Facility: CLINIC | Age: 49
End: 2024-09-20
Payer: COMMERCIAL

## 2024-09-20 VITALS
WEIGHT: 198.19 LBS | BODY MASS INDEX: 30.14 KG/M2 | HEART RATE: 62 BPM | SYSTOLIC BLOOD PRESSURE: 111 MMHG | DIASTOLIC BLOOD PRESSURE: 76 MMHG

## 2024-09-20 DIAGNOSIS — K21.9 GASTROESOPHAGEAL REFLUX DISEASE, UNSPECIFIED WHETHER ESOPHAGITIS PRESENT: Primary | ICD-10-CM

## 2024-09-20 DIAGNOSIS — R10.13 DYSPEPSIA: ICD-10-CM

## 2024-09-20 DIAGNOSIS — Z12.11 COLON CANCER SCREENING: ICD-10-CM

## 2024-09-20 DIAGNOSIS — K64.4 EXTERNAL HEMORRHOIDS: ICD-10-CM

## 2024-09-20 PROCEDURE — 99999 PR PBB SHADOW E&M-EST. PATIENT-LVL III: CPT | Mod: PBBFAC,,,

## 2024-09-20 RX ORDER — PANTOPRAZOLE SODIUM 40 MG/1
40 TABLET, DELAYED RELEASE ORAL DAILY
Qty: 90 TABLET | Refills: 3 | Status: SHIPPED | OUTPATIENT
Start: 2024-09-20 | End: 2025-09-20

## 2024-09-20 NOTE — PROGRESS NOTES
Gastroenterology Clinic Consultation Note    Reason for Visit:  The primary encounter diagnosis was Gastroesophageal reflux disease, unspecified whether esophagitis present. Diagnoses of External hemorrhoids and Dyspepsia were also pertinent to this visit.    PCP:   Leanne Stanley   [address] / [place] [address]    Initial HPI   This is a 49 y.o. male presenting for GERD symptoms. Patient is currently experiencing intensified episodes of reflux symptoms with associated epigastrium discomfort that began last Friday. He also experienced similar episode this morning when he woke up. Patient was recently evaluated in the ER on 9/13/2024 for suspicion of a cardiac related event as he has a strong family history of heart disease. Cardiac workup was ultimately negative with high suspicion for GERD. He experiences episodes of intense pain in the mid sternal region. He also experiences reflux with water brash. Does notice certain food triggers worsen symptoms but at this point feels like all foods are bothersome. He does have trouble swallowing pills. Feels like they are slow to go down. Denies any symptoms of dysphagia related to food or liquids. Does experience mild pain with swallowing. He is also having frequent nausea with no vomiting. Denies haematemesis. Patient is concerned for hiatal hernia.     Denies changes in bowel patterns but does know he has external hemorrhoids. Would like referral to CRS for intervention. Denies blood in stool.      Patient has longstanding history of GERD symptoms for approximately 10 years. He has been on omeprazole 20 mg daily since then. Up until recently, he has had no issues.     Denies any dysphagia. No abdominal pains, changes in bowel pattern, blood/ mucus in stool or unintentional weight loss. No melena or maroon stools. No recent changes in diet or medications. No family history of IBD (sister with IBD), Celiac disease or GI malignancy. No regular NSAIDs. No  alcohol (occasional use) or tobacco use. No recent antibiotic use, travels or sick contacts. No prior history of C.diff.    Interval History  ER Note on 9/13/2024 -49-year-old male who presents with chest pain.  Started this morning at around 5:45 a.m..  Did not wake him from sleep.  Was located in the left chest.  Some radiation to the left shoulder and then to the right chest.  Nothing seemed to make it better or worse.  No nausea or vomiting or diaphoresis.  Currently he was having very minimal tightness in the left chest and just feels fatigued.  No weakness or numbness in his extremities.  Reports some mild shortness of breath.  No coughing or fevers.  No leg swelling.  He has a history of hypertension but otherwise no medical problems.  He believes that either his father was brother may have had a heart attack.  He was not a smoker.  He does state that he had a very similar episode a few years ago and he had a full workup including a stress test and was told that it was likely reflux and he would have to have an endoscopy.  Of note, the patient took 1 baby aspirin this morning.    Abdominal Surgeries: None    ROS:  Review of Systems   Constitutional:  Negative for chills, fever, malaise/fatigue and weight loss.   Respiratory:  Negative for cough, hemoptysis, sputum production, shortness of breath and wheezing.    Cardiovascular:  Negative for chest pain, palpitations, orthopnea, claudication, leg swelling and PND.   Gastrointestinal:  Positive for abdominal pain, heartburn and nausea. Negative for blood in stool, constipation, diarrhea, melena and vomiting.   Genitourinary:  Negative for dysuria, flank pain, frequency, hematuria and urgency.   Musculoskeletal:  Negative for back pain, falls, joint pain, myalgias and neck pain.   Skin:  Negative for itching and rash.   Neurological:  Negative for dizziness, seizures, loss of consciousness, weakness and headaches.   Psychiatric/Behavioral:  The patient is  nervous/anxious. The patient does not have insomnia.       Medical History:  has a past medical history of HTN (hypertension), benign (5/16/2024) and Synovitis of hip.    Surgical History:  has a past surgical history that includes Hip surgery.    Family History: family history includes Heart disease in his paternal grandfather; No Known Problems in his mother; Pacemaker/defibrilator in his maternal grandfather; Prostate cancer in his father and maternal uncle; Transient ischemic attack in his maternal grandmother; Ulcerative colitis in his sister..     Review of patient's allergies indicates:   Allergen Reactions    Adhesive Other (See Comments)     Severe rash     Amoxicillin Nausea And Vomiting    Gabapentin Other (See Comments)     Blurry vision    Morphine Other (See Comments)    Oxycodone-acetaminophen Other (See Comments)     Current Outpatient Medications on File Prior to Visit   Medication Sig Dispense Refill    benazepriL (LOTENSIN) 40 MG tablet Take 1 tablet (40 mg total) by mouth once daily. 90 tablet 3    mometasone 0.1% (ELOCON) 0.1 % cream Apply topically once daily. 45 g 0    rosuvastatin (CRESTOR) 10 MG tablet Take 1 tablet (10 mg total) by mouth once daily. 90 tablet 3    [DISCONTINUED] omeprazole (PRILOSEC) 20 MG capsule Take 1 capsule (20 mg total) by mouth once daily. 30 capsule 0     No current facility-administered medications on file prior to visit.     Objective Findings:    Vital Signs:  /76   Pulse 62   Wt 89.9 kg (198 lb 3.1 oz)   BMI 30.14 kg/m²   Body mass index is 30.14 kg/m².    Physical Exam  Constitutional:       Appearance: Normal appearance. He is normal weight.   HENT:      Head: Normocephalic and atraumatic.      Mouth/Throat:      Mouth: Mucous membranes are moist.      Pharynx: Oropharynx is clear.   Cardiovascular:      Rate and Rhythm: Normal rate and regular rhythm.      Pulses: Normal pulses.      Heart sounds: Normal heart sounds.   Pulmonary:      Effort:  Pulmonary effort is normal.      Breath sounds: Normal breath sounds.   Abdominal:      General: Abdomen is flat. Bowel sounds are normal.      Palpations: Abdomen is soft.   Musculoskeletal:         General: Normal range of motion.      Cervical back: Normal range of motion and neck supple.   Skin:     General: Skin is warm and dry.   Neurological:      General: No focal deficit present.      Mental Status: He is alert and oriented to person, place, and time. Mental status is at baseline.   Psychiatric:         Mood and Affect: Mood normal.         Behavior: Behavior normal.         Thought Content: Thought content normal.         Judgment: Judgment normal.       Labs:  Lab Results   Component Value Date    WBC 7.32 09/13/2024    HGB 15.5 09/13/2024    HCT 46.5 09/13/2024     09/13/2024    CHOL 228 (H) 05/16/2024    TRIG 159 (H) 05/16/2024    HDL 54 05/16/2024    ALKPHOS 51 (L) 09/13/2024    ALT 31 09/13/2024    AST 18 09/13/2024     09/13/2024    K 4.6 09/13/2024     09/13/2024    CREATININE 1.3 09/13/2024    BUN 21 (H) 09/13/2024    CO2 26 09/13/2024    TSH 1.077 05/16/2024    PSA 0.98 05/16/2024    HGBA1C 5.4 05/16/2024     Imaging reviewed:   No recent/relevant GI imaging performed.    Endoscopy reviewed:   Never done    Assessment:  1. Gastroesophageal reflux disease, unspecified whether esophagitis present    2. External hemorrhoids    3. Dyspepsia      Orders Placed This Encounter    Ambulatory referral/consult to Colorectal Surgery    pantoprazole (PROTONIX) 40 MG tablet     Given nature of pain with negative cardiac workup, there is high suspicion this is a GERD exacerbation episode. Patient has longstanding history, with similar symptoms reported in 2018. EGD is recommended at this time with increase dose of PPI with H2 blocker for symptom relief. There is some suspicion for h.pylori vs ulcers.     Screening colonoscopy-Patient is at age recommendation for screening colonoscopy  especially given that there is IBD family history.     Plan:  Schedule EGD (with biopsies for h.pylori) & Screening colonoscopy  2. Stop taking omeprazole 20 mg and start taking pantoprazole 40 mg daily. Recommended taking pepcid at night before bed for symptom relief.   3. Recommended patient to follow up low fodmap diet included in AVS.  4. Referral to CRS for hemorrhoid evaluation.   5. F/u with GI in 3 months       Thank you for allowing me to participate in this patient's care.    Sincerely,     MARGO PACHECO  Gastroenterology Department  [facility]

## 2024-09-20 NOTE — PATIENT INSTRUCTIONS
-Stop taking omeprazole 20 mg  -Start taking pantoprazole 40 mg daily 30-45 minutes before breakfast  -Take famotidine (Pepcid) 20 mg over the counter at night before bed. If symptoms persist after a couple of days, increase to twice a day    For GERD/Reflux:     Take your PPI 30-45 minutes before your first protein containing meal (breakfast) every day. Take twice daily for 8 weeks. If symptoms improve ok discontinue an use PPI as needed for symptoms.      Take Pepcid 20mg every evening before bedtime to help with nocturnal symptoms, as needed.     Remain upright for at least 3 hours after eating.      Elevate the head of the bed for nighttime.      Avoid foods that you have noticed make your symptoms worse (possible triggers include: peppermint, alcohol, chocolate, caffeine, spicy foods, greasy/fried foods, acidic foods-citrus).     Low Fodmap Diet     Fodmaps (fructo,oligo,mono saccharides and polyols) are contained in certain foods and can cause significant bloating in some people. Reducing these foods can reduce bloating. Start off by cutting out anything with high fructose corn syrup in the ingredients.                                                   Foods suitable on a low-fodmap diet  fruit  banana, blueberry, boysenberry, canteloupe, cranberry, durian, grape,  grapefruit, honeydew melon, kiwifruit, lemon,lime, mandarin, orange,  passionfruit, pawpaw, raspberry, rhubarb, rockmelon, star anise,  strawberry, tangelo  vegetables  alfalfa, artichoke, bamboo shoots, bean shoots, bok pipo,  carrot, celery, choko, pipo sum, endive, ginger, green beans,  lettuce, olives, parsnip, potato, pumpkin, red capsicum (bell pepper),  silver beet, spinach, summer squash (yellow), swede, sweet potato, taro, tomato,  turnip, yam, zucchini   herbs  basil, chili, coriander, ginger, lemongrass,   marjoram, mint, oregano, parsley, rosemary, thyme  milk  lactose-free milk, oat milk*, rice milk, soy milk*  cheeses  hard cheeses,  and brie and camembert  yoghurt  lactose-free varieties  ice-cream substitutes  gelati, sorbet  butter substitutes  olive oilgrain foods  cereals  gluten-free bread or cereal products  bread  100% spelt bread  Rice, oats, polenta, other  arrowroot, millet, psyllium, quinoa, sorgum, tapioca  sweeteners  sugar* (sucrose), glucose, artificial sweeteners not ending in -ol  honey substitutes  gibbs syrup*, maple syrup*, molasses, treacle  *small quantities  *check for additives  Note: if fruit is dried, eat in small quantities     excess fructose lactose fructans galactans polyols                                                  Eliminate foods containing fodmaps  fruit  apple, apricot, avocado, blackberry, cherry, lychee, nashi, nectarine, peach, pear, plum,  prune, watermelon, yeimi, tinned fruit in natural juice,   sweeteners  sorbitol (420)  mannitol (421)  isomalt (953)  maltitol (965)  xylitol (967)  legumes  baked beans, chickpeas, kidney beans, lentils  vegetables  asparagus, beetroot, broccoli, brussels sprouts, cabbage, eggplant, fennel, garlic,  marbella, okra, onion (all), shallots, spring onion, cauliflower, green capsicum (bell pepper), mushroom, sweet corn  cereals  wheat and rye, in large amounts eg. Bread, crackers, cookies, couscous, pasta  fruit  custard apple, persimmon, watermelon  miscellaneous  chicory, dandelion, inulin  sweeteners  fructose, high fructose corn syrup  large total fructose dose  concentrated fruit sources, large servings of fruit, dried fruit, fruit juice  honey  corn syrup, fruisana  milk  milk from cows, goats or sheep, custard, ice cream, yoghurt  cheeses  soft unripened cheeses eg. cottage, cream, mascarpone     Additional FODMAPs Diet Reading List     IBS-Free At Last!: Second Edition: Change Your Carbs, Change Your Life with the FODMAP Elimination Diet, 2 nd edition by Kasie Graff, MS, RD     2012 copyright; [employer].   ISBN: [id number]     The Complete Idiots  Guide To Living Well With IBS by Sherine Judge RD, LDN  [facility], 2010     The FODMAPs Approach:--Minimize Consumption of Fermentable Carbs to Manage Functional Gut Disorder Symptoms  by Sherine Judge RD, LDN article found in  Todays Dietitian, vol. 12, no. 8, p. 30     The Inside Tract: Your Good Gut Guide To Great Digestive Health by Urbano Zhang MD and Daisha Alfaro, MS, RD, LDN  2011 copyright, [employer]   ISBN: [phone]     List assembled by: Alona Booker, MS, RD, LDN, E  [facility]  Last Updated: 7/13/12

## 2024-09-23 ENCOUNTER — TELEPHONE (OUTPATIENT)
Dept: ENDOSCOPY | Facility: HOSPITAL | Age: 49
End: 2024-09-23
Payer: COMMERCIAL

## 2024-09-23 DIAGNOSIS — R10.9 ABDOMINAL PAIN, UNSPECIFIED ABDOMINAL LOCATION: ICD-10-CM

## 2024-09-23 DIAGNOSIS — Z12.11 ENCOUNTER FOR SCREENING COLONOSCOPY: Primary | ICD-10-CM

## 2024-09-23 DIAGNOSIS — R11.2 NAUSEA AND VOMITING, UNSPECIFIED VOMITING TYPE: ICD-10-CM

## 2024-09-23 DIAGNOSIS — K21.9 GASTROESOPHAGEAL REFLUX DISEASE, UNSPECIFIED WHETHER ESOPHAGITIS PRESENT: ICD-10-CM

## 2024-09-23 RX ORDER — POLYETHYLENE GLYCOL 3350, SODIUM SULFATE ANHYDROUS, SODIUM BICARBONATE, SODIUM CHLORIDE, POTASSIUM CHLORIDE 236; 22.74; 6.74; 5.86; 2.97 G/4L; G/4L; G/4L; G/4L; G/4L
4 POWDER, FOR SOLUTION ORAL ONCE
Qty: 4000 ML | Refills: 0 | Status: SHIPPED | OUTPATIENT
Start: 2024-09-23 | End: 2024-09-23

## 2024-09-23 NOTE — TELEPHONE ENCOUNTER
Spoke to patient to schedule procedure(s) Colonoscopy/EGD       Physician to perform procedure(s) Dr. ANGELICA Trammell  Date of Procedure (s) 9/26/2024  Arrival Time 1:00 PM  Time of Procedure(s) 2:00 PM   Location of Procedure(s) [facility]  Type of Rx Prep sent to patient: PEG  Instructions provided to patient via [facility]    Patient was informed on the following information and verbalized understanding. Screening questionnaire reviewed with patient and complete. If procedure requires anesthesia, a responsible adult needs to be present to accompany the patient home, patient cannot drive after receiving anesthesia. Appointment details are tentative, especially check-in time. Patient will receive a prep-op call 7 days prior to confirm check-in time for procedure. If applicable the patient should contact their pharmacy to verify Rx for procedure prep is ready for pick-up. Patient was advised to call the scheduling department at [phone] if pharmacy states no Rx is available. Patient was advised to call the endoscopy scheduling department if any questions or concerns arise.      [facility] Scheduling Department

## 2024-09-23 NOTE — TELEPHONE ENCOUNTER
"    Message  Received: 3 days ago  Laura Alejandra NP Piglia, Ashley, RN  Caller: Unspecified (3 days ago,  9:38 AM)  Procedure: EGD/Colonoscopy    Diagnosis: Screening colonoscopy, Abdominal pain, GERD, and Nausea/Vomiting    Procedure Timing: Within 4 weeks (Urgent)    *If within 4 weeks selected, please david as high priority*    *If greater than 12 weeks, please select "5-12 weeks" and delay sending until 3 months prior to requested date*    Location:[place]    Additional Scheduling Information: No scheduling concerns    Prep Specifications:Standard prep    Is the patient taking a GLP-1 Agonist:no    Have you attached a patient to this message: yes  "

## 2024-09-24 ENCOUNTER — ANESTHESIA EVENT (OUTPATIENT)
Dept: ENDOSCOPY | Facility: HOSPITAL | Age: 49
End: 2024-09-24
Payer: COMMERCIAL

## 2024-09-24 NOTE — ANESTHESIA PREPROCEDURE EVALUATION
09/24/2024  Cayden Reza is a 49 y.o., male.    Past Medical History:   Diagnosis Date    HTN (hypertension), benign 5/16/2024    Synovitis of hip     right     Past Surgical History:   Procedure Laterality Date    HIP SURGERY      bilateral hip       Pre-op Assessment    I have reviewed the Patient Summary Reports.     I have reviewed the Nursing Notes. I have reviewed the NPO Status.   I have reviewed the Medications.     Review of Systems  Social:  Alcohol Use, Former Smoker       Hematology/Oncology:  Hematology Normal   Oncology Normal                                   EENT/Dental:  EENT/Dental Normal           Cardiovascular:     Hypertension                                        Pulmonary:  Pulmonary Normal                       Renal/:  Renal/ Normal                 Hepatic/GI:  Hepatic/GI Normal                 Musculoskeletal:  Musculoskeletal Normal                Neurological:  Neurology Normal                                      Endocrine:  Endocrine Normal            Dermatological:  Skin Normal    Psych:  Psychiatric Normal                  Physical Exam  General: Well nourished    Airway:  Mallampati: II   Mouth Opening: Normal  TM Distance: Normal    Chest/Lungs:  Normal Respiratory Rate    Heart:  Rate: Normal    Anesthesia Plan  Type of Anesthesia, risks & benefits discussed:    Anesthesia Type: Gen Natural Airway  Intra-op Monitoring Plan: Standard ASA Monitors  Induction:  IV  Informed Consent: Informed consent signed with the Patient and all parties understand the risks and agree with anesthesia plan.  All questions answered.   ASA Score: 2  Day of Surgery Review of History & Physical: H&P Update referred to the surgeon/provider.    Ready For Surgery From Anesthesia Perspective.     .

## 2024-09-26 ENCOUNTER — TELEPHONE (OUTPATIENT)
Dept: SURGERY | Facility: CLINIC | Age: 49
End: 2024-09-26
Payer: COMMERCIAL

## 2024-09-26 ENCOUNTER — ANESTHESIA (OUTPATIENT)
Dept: ENDOSCOPY | Facility: HOSPITAL | Age: 49
End: 2024-09-26
Payer: COMMERCIAL

## 2024-09-26 ENCOUNTER — HOSPITAL ENCOUNTER (OUTPATIENT)
Facility: HOSPITAL | Age: 49
Discharge: HOME | End: 2024-09-26
Attending: STUDENT IN AN ORGANIZED HEALTH CARE EDUCATION/TRAINING PROGRAM | Admitting: STUDENT IN AN ORGANIZED HEALTH CARE EDUCATION/TRAINING PROGRAM
Payer: COMMERCIAL

## 2024-09-26 VITALS
HEART RATE: 58 BPM | OXYGEN SATURATION: 99 % | BODY MASS INDEX: 29.55 KG/M2 | RESPIRATION RATE: 16 BRPM | TEMPERATURE: 98 F | HEIGHT: 68 IN | WEIGHT: 195 LBS | DIASTOLIC BLOOD PRESSURE: 78 MMHG | SYSTOLIC BLOOD PRESSURE: 117 MMHG

## 2024-09-26 DIAGNOSIS — Z12.11 COLON CANCER SCREENING: Primary | ICD-10-CM

## 2024-09-26 PROCEDURE — G0105 COLORECTAL SCRN; HI RISK IND: HCPCS | Mod: ,,, | Performed by: STUDENT IN AN ORGANIZED HEALTH CARE EDUCATION/TRAINING PROGRAM

## 2024-09-26 PROCEDURE — 99900035 HC TECH TIME PER 15 MIN (STAT)

## 2024-09-26 PROCEDURE — 37000008 HC ANESTHESIA 1ST 15 MINUTES: Performed by: STUDENT IN AN ORGANIZED HEALTH CARE EDUCATION/TRAINING PROGRAM

## 2024-09-26 PROCEDURE — 88305 TISSUE EXAM BY PATHOLOGIST: CPT | Mod: 26,,, | Performed by: PATHOLOGY

## 2024-09-26 PROCEDURE — 43239 EGD BIOPSY SINGLE/MULTIPLE: CPT | Performed by: STUDENT IN AN ORGANIZED HEALTH CARE EDUCATION/TRAINING PROGRAM

## 2024-09-26 PROCEDURE — 37000009 HC ANESTHESIA EA ADD 15 MINS: Performed by: STUDENT IN AN ORGANIZED HEALTH CARE EDUCATION/TRAINING PROGRAM

## 2024-09-26 PROCEDURE — 88305 TISSUE EXAM BY PATHOLOGIST: CPT | Mod: 59 | Performed by: PATHOLOGY

## 2024-09-26 PROCEDURE — 43239 EGD BIOPSY SINGLE/MULTIPLE: CPT | Mod: 51,,, | Performed by: STUDENT IN AN ORGANIZED HEALTH CARE EDUCATION/TRAINING PROGRAM

## 2024-09-26 PROCEDURE — 25000003 PHARM REV CODE 250: Performed by: NURSE ANESTHETIST, CERTIFIED REGISTERED

## 2024-09-26 PROCEDURE — 63600175 PHARM REV CODE 636 W HCPCS: Performed by: NURSE ANESTHETIST, CERTIFIED REGISTERED

## 2024-09-26 PROCEDURE — 94761 N-INVAS EAR/PLS OXIMETRY MLT: CPT

## 2024-09-26 PROCEDURE — G0105 COLORECTAL SCRN; HI RISK IND: HCPCS | Performed by: STUDENT IN AN ORGANIZED HEALTH CARE EDUCATION/TRAINING PROGRAM

## 2024-09-26 PROCEDURE — 27201012 HC FORCEPS, HOT/COLD, DISP: Performed by: STUDENT IN AN ORGANIZED HEALTH CARE EDUCATION/TRAINING PROGRAM

## 2024-09-26 RX ORDER — PROPOFOL 10 MG/ML
VIAL (ML) INTRAVENOUS
Status: DISCONTINUED | OUTPATIENT
Start: 2024-09-26 | End: 2024-09-26

## 2024-09-26 RX ORDER — SODIUM CHLORIDE 9 MG/ML
INJECTION, SOLUTION INTRAVENOUS CONTINUOUS
Status: DISCONTINUED | OUTPATIENT
Start: 2024-09-26 | End: 2024-09-26 | Stop reason: HOSPADM

## 2024-09-26 RX ORDER — LIDOCAINE HYDROCHLORIDE 20 MG/ML
INJECTION INTRAVENOUS
Status: DISCONTINUED | OUTPATIENT
Start: 2024-09-26 | End: 2024-09-26

## 2024-09-26 RX ORDER — PROPOFOL 10 MG/ML
VIAL (ML) INTRAVENOUS CONTINUOUS PRN
Status: DISCONTINUED | OUTPATIENT
Start: 2024-09-26 | End: 2024-09-26

## 2024-09-26 RX ADMIN — PROPOFOL 100 MG: 10 INJECTION, EMULSION INTRAVENOUS at 12:09

## 2024-09-26 RX ADMIN — LIDOCAINE HYDROCHLORIDE 100 MG: 20 INJECTION INTRAVENOUS at 12:09

## 2024-09-26 RX ADMIN — PROPOFOL 200 MCG/KG/MIN: 10 INJECTION, EMULSION INTRAVENOUS at 12:09

## 2024-09-26 RX ADMIN — SODIUM CHLORIDE: 0.9 INJECTION, SOLUTION INTRAVENOUS at 12:09

## 2024-09-26 NOTE — H&P
Short Stay Endoscopy History and Physical    PCP - Leanne Stanley MD  Referring Physician - PRE-ADMIT, ENDO -[facility]  No address on file    Procedure - EGD and Colonoscopy  ASA - per anesthesia  Mallampati - per anesthesia  History of Anesthesia problems - no  Family history Anesthesia problems -  no   Plan of anesthesia - General    HPI  49 y.o. male    Reason for procedure:   Encounter for screening colonoscopy [Z12.11]  Abdominal pain, unspecified abdominal location [R10.9]  Gastroesophageal reflux disease, unspecified whether esophagitis present [K21.9]  Nausea and vomiting, unspecified vomiting type [R11.2]      ROS:  Constitutional: No fevers, chills, No weight loss  CV: No chest pain  Pulm: No cough, No shortness of breath  GI: see HPI    Medical History:  has a past medical history of HTN (hypertension), benign (5/16/2024) and Synovitis of hip.    Surgical History:  has a past surgical history that includes Hip surgery.    Family History: family history includes Heart disease in his paternal grandfather; No Known Problems in his mother; Pacemaker/defibrilator in his maternal grandfather; Prostate cancer in his father and maternal uncle; Transient ischemic attack in his maternal grandmother; Ulcerative colitis in his sister.    Social History:  reports that he quit smoking about 28 years ago. His smoking use included cigarettes. He has never used smokeless tobacco. He reports current alcohol use. He reports that he does not use drugs.    Review of patient's allergies indicates:   Allergen Reactions    Adhesive Other (See Comments)     Severe rash     Amoxicillin Nausea And Vomiting    Gabapentin Other (See Comments)     Blurry vision    Morphine Other (See Comments)    Oxycodone-acetaminophen Other (See Comments)       Medications:   Medications Prior to Admission   Medication Sig Dispense Refill Last Dose    benazepriL (LOTENSIN) 40 MG tablet Take 1 tablet (40 mg total) by mouth once daily.  90 tablet 3 9/26/2024    pantoprazole (PROTONIX) 40 MG tablet Take 1 tablet (40 mg total) by mouth once daily. 90 tablet 3 9/26/2024    rosuvastatin (CRESTOR) 10 MG tablet Take 1 tablet (10 mg total) by mouth once daily. 90 tablet 3 9/26/2024    mometasone 0.1% (ELOCON) 0.1 % cream Apply topically once daily. 45 g 0 9/24/2024       Physical Exam:    Vital Signs:   Vitals:    09/26/24 1215   BP: 129/77   Pulse: 68   Resp: 18   Temp: 97.9 °F (36.6 °C)       General Appearance: Well appearing in no acute distress  Abdomen: Soft, non tender, non distended with normal bowel sounds, no masses    Labs:  Lab Results   Component Value Date    WBC 7.32 09/13/2024    HGB 15.5 09/13/2024    HCT 46.5 09/13/2024     09/13/2024    CHOL 228 (H) 05/16/2024    TRIG 159 (H) 05/16/2024    HDL 54 05/16/2024    ALT 31 09/13/2024    AST 18 09/13/2024     09/13/2024    K 4.6 09/13/2024     09/13/2024    CREATININE 1.3 09/13/2024    BUN 21 (H) 09/13/2024    CO2 26 09/13/2024    TSH 1.077 05/16/2024    PSA 0.98 05/16/2024    HGBA1C 5.4 05/16/2024       I have explained the risks and benefits of this endoscopic procedure to the patient including but not limited to bleeding, inflammation, infection, perforation, and death.      Hipolito Trammell MD

## 2024-09-26 NOTE — TRANSFER OF CARE
"Anesthesia Transfer of Care Note    Patient: Cayden Reza    Procedure(s) Performed: Procedure(s) (LRB):  EGD (ESOPHAGOGASTRODUODENOSCOPY) (N/A)  COLONOSCOPY (N/A)    Patient location: PACU    Anesthesia Type: general    Transport from OR: Transported from OR on room air with adequate spontaneous ventilation    Post pain: adequate analgesia    Post assessment: no apparent anesthetic complications and tolerated procedure well    Post vital signs: stable    Level of consciousness: alert and awake    Nausea/Vomiting: no nausea/vomiting    Complications: none    Transfer of care protocol was followed      Last vitals: Visit Vitals  /77 (BP Location: Left arm, Patient Position: Sitting)   Pulse 68   Temp 36.6 °C (97.9 °F) (Skin)   Resp 18   Ht 5' 8" (1.727 m)   Wt 88.5 kg (195 lb)   SpO2 98%   BMI 29.65 kg/m²     "

## 2024-09-26 NOTE — PLAN OF CARE
Handoff report received from procedural team. VS stable on transfer and while in recovery.  Discharge instructions reviewed with patient. Pt verbalizes understanding. Questions encouraged and answered. PIV removed with catheter intact. Escorted to vehicle via wheelchair.

## 2024-09-26 NOTE — PLAN OF CARE
Pt in preop bay 7, VSS and IV inserted. Pt denies any open wounds on body or the use of any weight loss injections. Pt needs an  updated H&P, procedural consents, an admit order.

## 2024-09-26 NOTE — PROVATION PATIENT INSTRUCTIONS
Discharge Summary/Instructions after an Endoscopic Procedure  Patient Name: Cayden Reza  Patient MRN: [id number]  Patient YOB: 1975 Thursday, September 26, 2024  Hipolito Trammell MD  Dear patient,  As a result of recent federal legislation (The Federal Cures Act), you may   receive lab or pathology results from your procedure in your [facility]   account before your physician is able to contact you. Your physician or   their representative will relay the results to you with their   recommendations at their soonest availability.  Thank you,  RESTRICTIONS:  During your procedure today, you received medications for sedation.  These   medications may affect your judgment, balance and coordination.  Therefore,   for 24 hours, you have the following restrictions:   - DO NOT drive a car, operate machinery, make legal/financial decisions,   sign important papers or drink alcohol.    ACTIVITY:  Today: no heavy lifting, straining or running due to procedural   sedation/anesthesia.  The following day: return to full activity including work.  DIET:  Eat and drink normally unless instructed otherwise.     TREATMENT FOR COMMON SIDE EFFECTS:  - Mild abdominal pain, nausea, belching, bloating or excessive gas:  rest,   eat lightly and use a heating pad.  - Sore Throat: treat with throat lozenges and/or gargle with warm salt   water.  - Because air was used during the procedure, expelling large amounts of air   from your rectum or belching is normal.  - If a bowel prep was taken, you may not have a bowel movement for 1-3 days.    This is normal.  SYMPTOMS TO WATCH FOR AND REPORT TO YOUR PHYSICIAN:  1. Abdominal pain or bloating, other than gas cramps.  2. Chest pain.  3. Back pain.  4. Signs of infection such as: chills or fever occurring within 24 hours   after the procedure.  5. Rectal bleeding, which would show as bright red, maroon, or black stools.   (A tablespoon of blood from the rectum is not serious,  especially if   hemorrhoids are present.)  6. Vomiting.  7. Weakness or dizziness.  GO DIRECTLY TO THE NEAREST EMERGENCY ROOM IF YOU HAVE ANY OF THE FOLLOWING:      Difficulty breathing              Chills and/or fever over 101 F   Persistent vomiting and/or vomiting blood   Severe abdominal pain   Severe chest pain   Black, tarry stools   Bleeding- more than one tablespoon   Any other symptom or condition that you feel may need urgent attention  Your doctor recommends these additional instructions:  If any biopsies were taken, your doctors clinic will contact you in 1 to 2   weeks with any results.  - Discharge patient to home (ambulatory).   - Patient has a contact number available for emergencies.  The signs and   symptoms of potential delayed complications were discussed with the   patient.  Return to normal activities tomorrow.  Written discharge   instructions were provided to the patient.   - Resume previous diet.   - Continue present medications.   - Return to primary care physician as previously scheduled.   - Repeat colonoscopy in 1 year for screening purposes given fair prep of   right colon.  For questions, problems or results please call your physician - Hipolito Trammell MD at Work:  [phone].  [facility], EMERGENCY ROOM PHONE NUMBER: [phone]  IF A COMPLICATION OR EMERGENCY SITUATION ARISES AND YOU ARE UNABLE TO REACH   YOUR PHYSICIAN - GO DIRECTLY TO THE EMERGENCY ROOM.  Hipolito Trammell MD  9/26/2024 1:03:54 PM  This report has been verified and signed electronically.  Dear patient,  As a result of recent federal legislation (The Federal Cures Act), you may   receive lab or pathology results from your procedure in your [facility]   account before your physician is able to contact you. Your physician or   their representative will relay the results to you with their   recommendations at their soonest availability.  Thank you,  PROVATION

## 2024-09-26 NOTE — ANESTHESIA POSTPROCEDURE EVALUATION
Anesthesia Post Evaluation    Patient: Cayden Reza    Procedure(s) Performed: Procedure(s) (LRB):  EGD (ESOPHAGOGASTRODUODENOSCOPY) (N/A)  COLONOSCOPY (N/A)    Final Anesthesia Type: general      Patient location during evaluation: PACU  Patient participation: Yes- Able to Participate  Level of consciousness: awake and alert  Post-procedure vital signs: reviewed and stable  Pain management: adequate  Airway patency: patent    PONV status at discharge: No PONV  Anesthetic complications: no      Cardiovascular status: stable  Respiratory status: spontaneous ventilation  Hydration status: euvolemic  Follow-up not needed.          Vitals Value Taken Time   /78 09/26/24 1320   Temp 36.6 °C (97.9 °F) 09/26/24 1304   Pulse 56 09/26/24 1322   Resp 21 09/26/24 1322   SpO2 99 % 09/26/24 1322   Vitals shown include unfiled device data.      Event Time   Out of Recovery 13:17:00         Pain/Andreina Score: Andreina Score: 10 (9/26/2024  1:19 PM)

## 2024-09-26 NOTE — PROVATION PATIENT INSTRUCTIONS
Discharge Summary/Instructions after an Endoscopic Procedure  Patient Name: Cayden Reza  Patient MRN: [id number]  Patient YOB: 1975 Thursday, September 26, 2024  Hipolito Trammell MD  Dear patient,  As a result of recent federal legislation (The Federal Cures Act), you may   receive lab or pathology results from your procedure in your [facility]   account before your physician is able to contact you. Your physician or   their representative will relay the results to you with their   recommendations at their soonest availability.  Thank you,  RESTRICTIONS:  During your procedure today, you received medications for sedation.  These   medications may affect your judgment, balance and coordination.  Therefore,   for 24 hours, you have the following restrictions:   - DO NOT drive a car, operate machinery, make legal/financial decisions,   sign important papers or drink alcohol.    ACTIVITY:  Today: no heavy lifting, straining or running due to procedural   sedation/anesthesia.  The following day: return to full activity including work.  DIET:  Eat and drink normally unless instructed otherwise.     TREATMENT FOR COMMON SIDE EFFECTS:  - Mild abdominal pain, nausea, belching, bloating or excessive gas:  rest,   eat lightly and use a heating pad.  - Sore Throat: treat with throat lozenges and/or gargle with warm salt   water.  - Because air was used during the procedure, expelling large amounts of air   from your rectum or belching is normal.  - If a bowel prep was taken, you may not have a bowel movement for 1-3 days.    This is normal.  SYMPTOMS TO WATCH FOR AND REPORT TO YOUR PHYSICIAN:  1. Abdominal pain or bloating, other than gas cramps.  2. Chest pain.  3. Back pain.  4. Signs of infection such as: chills or fever occurring within 24 hours   after the procedure.  5. Rectal bleeding, which would show as bright red, maroon, or black stools.   (A tablespoon of blood from the rectum is not serious,  especially if   hemorrhoids are present.)  6. Vomiting.  7. Weakness or dizziness.  GO DIRECTLY TO THE NEAREST EMERGENCY ROOM IF YOU HAVE ANY OF THE FOLLOWING:      Difficulty breathing              Chills and/or fever over 101 F   Persistent vomiting and/or vomiting blood   Severe abdominal pain   Severe chest pain   Black, tarry stools   Bleeding- more than one tablespoon   Any other symptom or condition that you feel may need urgent attention  Your doctor recommends these additional instructions:  If any biopsies were taken, your doctors clinic will contact you in 1 to 2   weeks with any results.  - Discharge patient to home.   - Await pathology results.   - The findings and recommendations were discussed with the patient.   - Patient has a contact number available for emergencies.  The signs and   symptoms of potential delayed complications were discussed with the   patient.  Return to normal activities tomorrow.  Written discharge   instructions were provided to the patient.  For questions, problems or results please call your physician - Hipolito Trammell MD at Work:  [phone].  [facility], EMERGENCY ROOM PHONE NUMBER: [phone]  IF A COMPLICATION OR EMERGENCY SITUATION ARISES AND YOU ARE UNABLE TO REACH   YOUR PHYSICIAN - GO DIRECTLY TO THE EMERGENCY ROOM.  Hipolito Trammell MD  9/26/2024 1:02:07 PM  This report has been verified and signed electronically.  Dear patient,  As a result of recent federal legislation (The Federal Cures Act), you may   receive lab or pathology results from your procedure in your [facility]   account before your physician is able to contact you. Your physician or   their representative will relay the results to you with their   recommendations at their soonest availability.  Thank you,  PROVATION

## 2024-09-30 LAB
FINAL PATHOLOGIC DIAGNOSIS: NORMAL
GROSS: NORMAL
Lab: NORMAL

## 2024-10-01 ENCOUNTER — PATIENT MESSAGE (OUTPATIENT)
Dept: PRIMARY CARE CLINIC | Facility: CLINIC | Age: 49
End: 2024-10-01
Payer: COMMERCIAL

## 2024-10-01 DIAGNOSIS — K21.9 GASTROESOPHAGEAL REFLUX DISEASE WITHOUT ESOPHAGITIS: Primary | ICD-10-CM

## 2024-10-01 RX ORDER — PANTOPRAZOLE SODIUM 40 MG/1
40 TABLET, DELAYED RELEASE ORAL 2 TIMES DAILY
Qty: 90 TABLET | Refills: 3 | Status: SHIPPED | OUTPATIENT
Start: 2024-10-01 | End: 2025-03-30

## 2024-10-02 ENCOUNTER — OFFICE VISIT (OUTPATIENT)
Dept: PRIMARY CARE CLINIC | Facility: CLINIC | Age: 49
End: 2024-10-02
Payer: COMMERCIAL

## 2024-10-02 VITALS
WEIGHT: 197.31 LBS | SYSTOLIC BLOOD PRESSURE: 122 MMHG | HEART RATE: 61 BPM | DIASTOLIC BLOOD PRESSURE: 70 MMHG | OXYGEN SATURATION: 96 % | BODY MASS INDEX: 30 KG/M2

## 2024-10-02 DIAGNOSIS — Z82.49 FAMILY HISTORY OF HEART ATTACK: ICD-10-CM

## 2024-10-02 DIAGNOSIS — Z83.3 FAMILY HISTORY OF DIABETES MELLITUS: ICD-10-CM

## 2024-10-02 DIAGNOSIS — I10 HTN (HYPERTENSION), BENIGN: ICD-10-CM

## 2024-10-02 DIAGNOSIS — F41.9 ANXIETY: ICD-10-CM

## 2024-10-02 DIAGNOSIS — E78.5 HYPERLIPIDEMIA, UNSPECIFIED HYPERLIPIDEMIA TYPE: Primary | ICD-10-CM

## 2024-10-02 DIAGNOSIS — K21.9 GASTROESOPHAGEAL REFLUX DISEASE, UNSPECIFIED WHETHER ESOPHAGITIS PRESENT: ICD-10-CM

## 2024-10-02 PROCEDURE — 99999 PR PBB SHADOW E&M-EST. PATIENT-LVL III: CPT | Mod: PBBFAC,,, | Performed by: STUDENT IN AN ORGANIZED HEALTH CARE EDUCATION/TRAINING PROGRAM

## 2024-10-02 PROCEDURE — 99214 OFFICE O/P EST MOD 30 MIN: CPT | Mod: S$GLB,,, | Performed by: STUDENT IN AN ORGANIZED HEALTH CARE EDUCATION/TRAINING PROGRAM

## 2024-10-02 RX ORDER — BUSPIRONE HYDROCHLORIDE 5 MG/1
5 TABLET ORAL 3 TIMES DAILY
Qty: 90 TABLET | Refills: 3 | Status: SHIPPED | OUTPATIENT
Start: 2024-10-02 | End: 2024-11-13

## 2024-10-02 RX ORDER — ROSUVASTATIN CALCIUM 10 MG/1
10 TABLET, COATED ORAL DAILY
Qty: 90 TABLET | Refills: 3 | Status: SHIPPED | OUTPATIENT
Start: 2024-10-02 | End: 2024-10-02 | Stop reason: SDUPTHER

## 2024-10-02 RX ORDER — ROSUVASTATIN CALCIUM 10 MG/1
10 TABLET, COATED ORAL DAILY
Qty: 90 TABLET | Refills: 3 | Status: SHIPPED | OUTPATIENT
Start: 2024-10-02

## 2024-10-02 RX ORDER — BENAZEPRIL HYDROCHLORIDE 20 MG/1
20 TABLET ORAL DAILY
Qty: 90 TABLET | Refills: 3 | Status: SHIPPED | OUTPATIENT
Start: 2024-10-02 | End: 2025-10-02

## 2024-10-02 RX ORDER — POLYETHYLENE GLYCOL-3350 AND ELECTROLYTES 236; 6.74; 5.86; 2.97; 22.74 G/274.31G; G/274.31G; G/274.31G; G/274.31G; G/274.31G
POWDER, FOR SOLUTION ORAL
COMMUNITY
Start: 2024-09-23 | End: 2024-10-02

## 2024-10-02 NOTE — PROGRESS NOTES
SUBJECTIVE     Chief Complaint   Patient presents with    Follow-up       HPI  Cayden Reza is a 49 y.o. male with medical diagnoses as listed in the medical history and problem list that presents for follow up      Family, social, surgical Hx reviewed     HTN: benazepril 20 mg daily    HLD: Crestor 10 mg daily, tolerating without issues.     GERD: Omeprazole prn reflux. No blood in stool.       Health Maintenance         Date Due Completion Date    Pneumococcal Vaccines (Age 0-64) (1 of 2 - PCV) Never done ---    TETANUS VACCINE Never done ---    Shingles Vaccine (1 of 2) Never done ---    Influenza Vaccine (1) Never done ---    COVID-19 Vaccine (2024- season) Never done ---    Colorectal Cancer Screening 2025    Hemoglobin A1c (Diabetic Prevention Screening) 2027    Lipid Panel 2029    RSV Vaccine (Age 60+ and Pregnant patients) (1 - 1-dose 75+ series) 2049 ---              PAST MEDICAL HISTORY:  Past Medical History:   Diagnosis Date    HTN (hypertension), benign 2024    Synovitis of hip     right       PAST SURGICAL HISTORY:  Past Surgical History:   Procedure Laterality Date    COLONOSCOPY N/A 2024    Procedure: COLONOSCOPY;  Surgeon: Hipolito Trammell MD;  Location: [facility] ENDOSCOPY;  Service: Endoscopy;  Laterality: N/A;    ESOPHAGOGASTRODUODENOSCOPY N/A 2024    Procedure: EGD (ESOPHAGOGASTRODUODENOSCOPY);  Surgeon: Hipolito Trammell MD;  Location: [facility] ENDOSCOPY;  Service: Endoscopy;  Laterality: N/A;  clinic pt ref by jacqui morocho np; [facility]; instr via [facility]; [facility]    HIP SURGERY      bilateral hip       SOCIAL HISTORY:  Social History     Socioeconomic History    Marital status:    Tobacco Use    Smoking status: Former     Current packs/day: 0.00     Types: Cigarettes     Quit date: 1996     Years since quittin.4    Smokeless tobacco: Never   Substance and Sexual Activity    Alcohol use: Yes     Comment:  socially    Drug use: No     Social Drivers of Health     Financial Resource Strain: Low Risk  (11/7/2024)    Overall Financial Resource Strain (CARDIA)     Difficulty of Paying Living Expenses: Not very hard   Food Insecurity: No Food Insecurity (11/7/2024)    Hunger Vital Sign     Worried About Running Out of Food in the Last Year: Never true     Ran Out of Food in the Last Year: Never true   Physical Activity: Sufficiently Active (11/7/2024)    Exercise Vital Sign     Days of Exercise per Week: 3 days     Minutes of Exercise per Session: 50 min   Stress: Stress Concern Present (11/7/2024)    [employer] - Occupational Stress Questionnaire     Feeling of Stress : To some extent   Housing Stability: Unknown (11/7/2024)    Housing Stability Vital Sign     Unable to Pay for Housing in the Last Year: No       FAMILY HISTORY:  Family History   Problem Relation Name Age of Onset    No Known Problems Mother      Prostate cancer Father      Ulcerative colitis Sister      Transient ischemic attack Maternal Grandmother      Pacemaker/defibrilator Maternal Grandfather      Heart disease Paternal Grandfather      Prostate cancer Maternal Uncle      Stomach cancer Neg Hx      Esophageal cancer Neg Hx      Irritable bowel syndrome Neg Hx      Crohn's disease Neg Hx      Celiac disease Neg Hx         ALLERGIES AND MEDICATIONS: updated and reviewed.  Review of patient's allergies indicates:   Allergen Reactions    Adhesive Other (See Comments)     Severe rash     Amoxicillin Nausea And Vomiting    Gabapentin Other (See Comments)     Blurry vision    Morphine Other (See Comments)    Oxycodone-acetaminophen Other (See Comments)     Current Outpatient Medications   Medication Sig Dispense Refill    mometasone 0.1% (ELOCON) 0.1 % cream Apply topically once daily. 45 g 0    pantoprazole (PROTONIX) 40 MG tablet Take 1 tablet (40 mg total) by mouth 2 (two) times daily. 90 tablet 3    benazepriL (LOTENSIN) 20  MG tablet Take 1 tablet (20 mg total) by mouth once daily. 90 tablet 3    mupirocin (BACTROBAN) 2 % ointment Apply topically 3 (three) times daily. 30 g 0    rosuvastatin (CRESTOR) 10 MG tablet Take 1 tablet (10 mg total) by mouth once daily. 90 tablet 3     No current facility-administered medications for this visit.       ROS  Review of Systems   Constitutional:  Negative for fever and weight loss.   Respiratory:  Negative for cough and shortness of breath.    Cardiovascular:  Negative for chest pain and palpitations.   Gastrointestinal:  Negative for abdominal pain, constipation, diarrhea, nausea and vomiting.   Genitourinary:  Negative for dysuria.   Musculoskeletal:  Negative for back pain and joint pain.   Skin:  Negative for rash.   Neurological:  Negative for dizziness, weakness and headaches.   Psychiatric/Behavioral:  Negative for depression. The patient is not nervous/anxious.            OBJECTIVE     Physical Exam  Vitals:    10/02/24 1102   BP: 122/70   Pulse: 61    Body mass index is 30 kg/m².  Weight: 89.5 kg (197 lb 5 oz)         Physical Exam  HENT:      Head: Normocephalic and atraumatic.      Nose: Nose normal.      Mouth/Throat:      Mouth: Mucous membranes are moist.      Pharynx: Oropharynx is clear.   Eyes:      Extraocular Movements: Extraocular movements intact.      Conjunctiva/sclera: Conjunctivae normal.      Pupils: Pupils are equal, round, and reactive to light.   Pulmonary:      Effort: Pulmonary effort is normal.   Musculoskeletal:         General: No swelling. Normal range of motion.      Cervical back: Normal range of motion.      Right lower leg: No edema.      Left lower leg: No edema.   Skin:     General: Skin is warm.      Findings: No lesion or rash.   Neurological:      General: No focal deficit present.      Mental Status: He is alert and oriented to person, place, and time.      Motor: No weakness.             ASSESSMENT     49 y.o. male with     1. Hyperlipidemia,  unspecified hyperlipidemia type    2. HTN (hypertension), benign    3. Gastroesophageal reflux disease, unspecified whether esophagitis present    4. Anxiety    5. Family history of diabetes mellitus    6. Family history of heart attack          PLAN:     1. Hyperlipidemia, unspecified hyperlipidemia type  -     Discontinue: rosuvastatin (CRESTOR) 10 MG tablet; Take 1 tablet (10 mg total) by mouth once daily.  Dispense: 90 tablet; Refill: 3  -     rosuvastatin (CRESTOR) 10 MG tablet; Take 1 tablet (10 mg total) by mouth once daily.  Dispense: 90 tablet; Refill: 3  Stable on medications, continue regimen    2. HTN (hypertension), benign  -     benazepriL (LOTENSIN) 20 MG tablet; Take 1 tablet (20 mg total) by mouth once daily.  Dispense: 90 tablet; Refill: 3  Stable on medications, continue regimen    3. Gastroesophageal reflux disease, unspecified whether esophagitis present  Stable on medications, continue regimen    4. Anxiety  -     Discontinue: busPIRone (BUSPAR) 5 MG Tab; Take 1 tablet (5 mg total) by mouth 3 (three) times daily.  Dispense: 90 tablet; Refill: 3    5. Family history of diabetes mellitus  6. Family history of heart attack  -     Discontinue: rosuvastatin (CRESTOR) 10 MG tablet; Take 1 tablet (10 mg total) by mouth once daily.  Dispense: 90 tablet; Refill: 3  -     rosuvastatin (CRESTOR) 10 MG tablet; Take 1 tablet (10 mg total) by mouth once daily.  Dispense: 90 tablet; Refill: 3            Discussed age and gender appropriate screenings at this visit and encouraged a healthy diet low in simple carbohydrates, and increased physical activity.  Counseled on medically appropriate vaccines based on age and current health condition.  Screening test reviewed and discussed with patient.      RTC for annual exam    Leanne Stanley MD

## 2024-10-11 ENCOUNTER — TELEPHONE (OUTPATIENT)
Dept: SURGERY | Facility: CLINIC | Age: 49
End: 2024-10-11
Payer: COMMERCIAL

## 2024-10-11 DIAGNOSIS — K21.9 GASTROESOPHAGEAL REFLUX DISEASE WITHOUT ESOPHAGITIS: ICD-10-CM

## 2024-10-11 RX ORDER — PANTOPRAZOLE SODIUM 40 MG/1
40 TABLET, DELAYED RELEASE ORAL 2 TIMES DAILY
Qty: 90 TABLET | Refills: 3 | Status: CANCELLED | OUTPATIENT
Start: 2024-10-11 | End: 2025-04-09

## 2024-11-11 ENCOUNTER — TELEPHONE (OUTPATIENT)
Dept: SURGERY | Facility: CLINIC | Age: 49
End: 2024-11-11
Payer: COMMERCIAL

## 2024-11-13 ENCOUNTER — OFFICE VISIT (OUTPATIENT)
Dept: SURGERY | Facility: CLINIC | Age: 49
End: 2024-11-13
Payer: COMMERCIAL

## 2024-11-13 VITALS
WEIGHT: 198.44 LBS | HEART RATE: 71 BPM | BODY MASS INDEX: 30.07 KG/M2 | SYSTOLIC BLOOD PRESSURE: 137 MMHG | DIASTOLIC BLOOD PRESSURE: 91 MMHG | RESPIRATION RATE: 16 BRPM | HEIGHT: 68 IN

## 2024-11-13 DIAGNOSIS — K64.4 EXTERNAL HEMORRHOIDS: ICD-10-CM

## 2024-11-13 DIAGNOSIS — L73.9 FOLLICULITIS: Primary | ICD-10-CM

## 2024-11-13 PROCEDURE — 99999 PR PBB SHADOW E&M-EST. PATIENT-LVL III: CPT | Mod: PBBFAC,,, | Performed by: NURSE PRACTITIONER

## 2024-11-13 PROCEDURE — 99203 OFFICE O/P NEW LOW 30 MIN: CPT | Mod: S$GLB,,, | Performed by: NURSE PRACTITIONER

## 2024-11-13 RX ORDER — MUPIROCIN 20 MG/G
OINTMENT TOPICAL 3 TIMES DAILY
Qty: 30 G | Refills: 0 | Status: SHIPPED | OUTPATIENT
Start: 2024-11-13

## 2024-11-13 NOTE — PROGRESS NOTES
CRS Office Visit History and Physical    Referring Md:   Laura Alejandra, Np  [address]  [address]  [place]  [place] [address]    SUBJECTIVE:     Chief Complaint: external hemorrhoids    History of Present Illness:  The patient is new patient to this practice.   Course is as follows:  Patient is a 49 y.o. male presents with external hemorrhoid.  Painful, bleeds, itches, difficult to clean.  Symptoms have been present for many years. Would like removed.  Reports hx of up to 4x/day loose stools. Has since started taking prebiotics and collagen which have improved bowel movements to 2 formed stools/day.  Associated bleeding: yes  Previous anorectal procedures: No  denies straining/prolonged time on toilet with bowel movements.  is not currently taking fiber supplement or stool softener  Blood thinners: No    Last Colonoscopy completed on 9/26/2024  - Preparation of the colon was fair.   - Stool in the ascending colon and in the cecum.   - Anal papilla(e) were hypertrophied.   - The examination was otherwise normal on direct and retroflexion views.   - No specimens collected.   - repeat in 1 yr d/t fair prep      Review of patient's allergies indicates:   Allergen Reactions    Adhesive Other (See Comments)     Severe rash     Amoxicillin Nausea And Vomiting    Gabapentin Other (See Comments)     Blurry vision    Morphine Other (See Comments)    Oxycodone-acetaminophen Other (See Comments)       Past Medical History:   Diagnosis Date    HTN (hypertension), benign 5/16/2024    Synovitis of hip     right     Past Surgical History:   Procedure Laterality Date    COLONOSCOPY N/A 9/26/2024    Procedure: COLONOSCOPY;  Surgeon: Hipolito Trammell MD;  Location: [facility] ENDOSCOPY;  Service: Endoscopy;  Laterality: N/A;    ESOPHAGOGASTRODUODENOSCOPY N/A 9/26/2024    Procedure: EGD (ESOPHAGOGASTRODUODENOSCOPY);  Surgeon: Hipolito Trammell MD;  Location: [facility] ENDOSCOPY;  Service: Endoscopy;  Laterality: N/A;  clinic  "pt ref by jacqui morocho np; [facility]; instr via [facility]; [facility]    HIP SURGERY      bilateral hip     Family History   Problem Relation Name Age of Onset    No Known Problems Mother      Prostate cancer Father      Ulcerative colitis Sister      Transient ischemic attack Maternal Grandmother      Pacemaker/defibrilator Maternal Grandfather      Heart disease Paternal Grandfather      Prostate cancer Maternal Uncle      Stomach cancer Neg Hx      Esophageal cancer Neg Hx      Irritable bowel syndrome Neg Hx      Crohn's disease Neg Hx      Celiac disease Neg Hx       Social History     Tobacco Use    Smoking status: Former     Current packs/day: 0.00     Types: Cigarettes     Quit date: 1996     Years since quittin.4    Smokeless tobacco: Never   Substance Use Topics    Alcohol use: Yes     Comment: socially    Drug use: No        Review of Systems:  Review of Systems   Gastrointestinal:  Positive for blood in stool and diarrhea.        Anal irritation   Musculoskeletal:  Positive for joint pain.   Skin:  Positive for itching.       OBJECTIVE:     Vital Signs (Most Recent)  BP (!) 137/91 (BP Location: Left arm, Patient Position: Sitting)   Pulse 71   Resp 16   Ht 5' 8" (1.727 m)   Wt 90 kg (198 lb 6.6 oz)   BMI 30.17 kg/m²     Physical Exam:  General: White male in no distress   Neuro: Alert and oriented to person, place, and time.  Moves all extremities.     HEENT: No icterus.  Trachea midline  Respiratory: Respirations are even and unlabored, no cough or audible wheezing  Skin: Warm dry and intact, No visible rashes, no jaundice    Labs reviewed today:  Lab Results   Component Value Date    WBC 7.32 2024    HGB 15.5 2024    HCT 46.5 2024     2024    CHOL 228 (H) 2024    TRIG 159 (H) 2024    HDL 54 2024    ALT 31 2024    AST 18 2024     2024    K 4.6 2024     2024    CREATININE 1.3 2024    BUN 21 (H) 2024 "    CO2 26 09/13/2024    TSH 1.077 05/16/2024    PSA 0.98 05/16/2024    HGBA1C 5.4 05/16/2024       Imaging reviewed today:  none    Endoscopy reviewed today:  Last Colonoscopy completed on 9/26/2024  - Preparation of the colon was fair.   - Stool in the ascending colon and in the cecum.   - Anal papilla(e) were hypertrophied.   - The examination was otherwise normal on direct and retroflexion views.   - No specimens collected.   - repeat in 1 yr d/t fair prep    Anorectal Exam:    Anal Skin: External hemorrhoids, condyloma    Digital Rectal Exam:  Deferred d/t recent colonoscopy    ASSESSMENT/PLAN:     Diagnoses and all orders for this visit:    External hemorrhoids  -     Ambulatory referral/consult to Colorectal Surgery        The patient was instructed to:  [facility].       AUSTIN Mcclure-C  Colon and Rectal Surgery

## 2024-11-14 ENCOUNTER — TELEPHONE (OUTPATIENT)
Dept: SURGERY | Facility: CLINIC | Age: 49
End: 2024-11-14
Payer: COMMERCIAL

## 2024-11-15 NOTE — PROGRESS NOTES
CRS Office Visit History and Physical    Referring Md:   No referring provider defined for this encounter.    SUBJECTIVE:     Chief Complaint: hemorrhoids, condyloma    History of Present Illness:  The patient is an established patient to this practice.   Course is as follows:  Cayden Reza is a 49 y.o. male presents with external hemorrhoids and concern condyloma.  Seen by Chelsea Powell 11/13/24 and found to have external hemorrhoids and a polypoid lesion.  He reports a recent history of diarrhea.  Had EGD and colonoscopy in September with fair prep, hypertrophied anal papilla noted.  Patient reports that loose bowel movements have improved with a pre-biotic.  Does report significant wiping due to difficulty with hygiene.  Family history of UC in his sister.      Last Colonoscopy: 9/26/24    Review of patient's allergies indicates:   Allergen Reactions    Adhesive Other (See Comments)     Severe rash     Amoxicillin Nausea And Vomiting    Gabapentin Other (See Comments)     Blurry vision    Morphine Other (See Comments)    Oxycodone-acetaminophen Other (See Comments)       Past Medical History:   Diagnosis Date    HTN (hypertension), benign 5/16/2024    Synovitis of hip     right     Past Surgical History:   Procedure Laterality Date    COLONOSCOPY N/A 9/26/2024    Procedure: COLONOSCOPY;  Surgeon: Hipolito Trammell MD;  Location: [facility] ENDOSCOPY;  Service: Endoscopy;  Laterality: N/A;    ESOPHAGOGASTRODUODENOSCOPY N/A 9/26/2024    Procedure: EGD (ESOPHAGOGASTRODUODENOSCOPY);  Surgeon: Hipolito Trammell MD;  Location: [facility] ENDOSCOPY;  Service: Endoscopy;  Laterality: N/A;  clinic pt ref by jacqui morocho np; [facility]; instr via [facility]; [facility]    HIP SURGERY      bilateral hip     Family History   Problem Relation Name Age of Onset    No Known Problems Mother      Prostate cancer Father      Ulcerative colitis Sister      Transient ischemic attack Maternal Grandmother      Pacemaker/defibrilator Maternal  "Grandfather      Heart disease Paternal Grandfather      Prostate cancer Maternal Uncle      Stomach cancer Neg Hx      Esophageal cancer Neg Hx      Irritable bowel syndrome Neg Hx      Crohn's disease Neg Hx      Celiac disease Neg Hx       Social History     Tobacco Use    Smoking status: Former     Current packs/day: 0.00     Types: Cigarettes     Quit date: 1996     Years since quittin.4    Smokeless tobacco: Never   Substance Use Topics    Alcohol use: Yes     Comment: socially    Drug use: No        Review of Systems:  Review of Systems   All other systems reviewed and are negative.      OBJECTIVE:     Vital Signs (Most Recent)  /79 (BP Location: Left arm, Patient Position: Sitting)   Pulse 72   Ht 5' 7.99" (1.727 m)   Wt 90.1 kg (198 lb 10.2 oz)   BMI 30.21 kg/m²     Physical Exam:  General: 49 y.o. male in no distress   Neuro: alert and oriented x 4.  Moves all extremities.     HEENT: normocephalic, atraumatic, PERRL, EOMI   Respiratory: respirations are even and unlabored  Cardiac: regular rate and rhythm  Abdomen: soft, NTND  Extremities: Warm dry and intact  Skin: no rashes  Anorectal: polypoid external anal lesion at right posterolateral region with abnormal thickening/skin changes, small external hemorrhoid skin tags, GERALDINE with intact tone no masses or blood    Anoscopy: Verbal consent obtained. A lubricated anoscope was inserted and circumferential inspection performed.  There were a few anal papilla, no condyloma, masses or other abnormalities. The scope was withdrawn.     Labs: NA    Imaging: NA      ASSESSMENT/PLAN:     Diagnoses and all orders for this visit:    External hemorrhoids    Anal lesion  -     Case Request Operating Room: EXCISION, LESION, ANUS    49 y.o. male with anal skin tag and hypertrophied papilla.  Changes concerning for possible condyloma.    - we discussed office excision vs excision with anesthesia, will proceed to OR on  at [facility]   - risks, benefits " and alternatives of the procedure discussed and consent signed.    - calmoseptine BID to perianal area for barrier cream      Lilliam Curtis MD  Staff Surgeon  Colon & Rectal Surgery

## 2024-11-15 NOTE — H&P (VIEW-ONLY)
CRS Office Visit History and Physical    Referring Md:   No referring provider defined for this encounter.    SUBJECTIVE:     Chief Complaint: hemorrhoids, condyloma    History of Present Illness:  The patient is an established patient to this practice.   Course is as follows:  Cayden Reza is a 49 y.o. male presents with external hemorrhoids and concern condyloma.  Seen by Chelsea Powell 11/13/24 and found to have external hemorrhoids and a polypoid lesion.  He reports a recent history of diarrhea.  Had EGD and colonoscopy in September with fair prep, hypertrophied anal papilla noted.  Patient reports that loose bowel movements have improved with a pre-biotic.  Does report significant wiping due to difficulty with hygiene.  Family history of UC in his sister.      Last Colonoscopy: 9/26/24    Review of patient's allergies indicates:   Allergen Reactions    Adhesive Other (See Comments)     Severe rash     Amoxicillin Nausea And Vomiting    Gabapentin Other (See Comments)     Blurry vision    Morphine Other (See Comments)    Oxycodone-acetaminophen Other (See Comments)       Past Medical History:   Diagnosis Date    HTN (hypertension), benign 5/16/2024    Synovitis of hip     right     Past Surgical History:   Procedure Laterality Date    COLONOSCOPY N/A 9/26/2024    Procedure: COLONOSCOPY;  Surgeon: Hipolito Trammell MD;  Location: [facility] ENDOSCOPY;  Service: Endoscopy;  Laterality: N/A;    ESOPHAGOGASTRODUODENOSCOPY N/A 9/26/2024    Procedure: EGD (ESOPHAGOGASTRODUODENOSCOPY);  Surgeon: Hipolito Trammell MD;  Location: [facility] ENDOSCOPY;  Service: Endoscopy;  Laterality: N/A;  clinic pt ref by jacqui morocho np; [facility]; instr via [facility]; [facility]    HIP SURGERY      bilateral hip     Family History   Problem Relation Name Age of Onset    No Known Problems Mother      Prostate cancer Father      Ulcerative colitis Sister      Transient ischemic attack Maternal Grandmother      Pacemaker/defibrilator Maternal  "Grandfather      Heart disease Paternal Grandfather      Prostate cancer Maternal Uncle      Stomach cancer Neg Hx      Esophageal cancer Neg Hx      Irritable bowel syndrome Neg Hx      Crohn's disease Neg Hx      Celiac disease Neg Hx       Social History     Tobacco Use    Smoking status: Former     Current packs/day: 0.00     Types: Cigarettes     Quit date: 1996     Years since quittin.4    Smokeless tobacco: Never   Substance Use Topics    Alcohol use: Yes     Comment: socially    Drug use: No        Review of Systems:  Review of Systems   All other systems reviewed and are negative.      OBJECTIVE:     Vital Signs (Most Recent)  /79 (BP Location: Left arm, Patient Position: Sitting)   Pulse 72   Ht 5' 7.99" (1.727 m)   Wt 90.1 kg (198 lb 10.2 oz)   BMI 30.21 kg/m²     Physical Exam:  General: 49 y.o. male in no distress   Neuro: alert and oriented x 4.  Moves all extremities.     HEENT: normocephalic, atraumatic, PERRL, EOMI   Respiratory: respirations are even and unlabored  Cardiac: regular rate and rhythm  Abdomen: soft, NTND  Extremities: Warm dry and intact  Skin: no rashes  Anorectal: polypoid external anal lesion at right posterolateral region with abnormal thickening/skin changes, small external hemorrhoid skin tags, GERALDINE with intact tone no masses or blood    Anoscopy: Verbal consent obtained. A lubricated anoscope was inserted and circumferential inspection performed.  There were a few anal papilla, no condyloma, masses or other abnormalities. The scope was withdrawn.     Labs: NA    Imaging: NA      ASSESSMENT/PLAN:     Diagnoses and all orders for this visit:    External hemorrhoids    Anal lesion  -     Case Request Operating Room: EXCISION, LESION, ANUS    49 y.o. male with anal skin tag and hypertrophied papilla.  Changes concerning for possible condyloma.    - we discussed office excision vs excision with anesthesia, will proceed to OR on  at [facility]   - risks, benefits " and alternatives of the procedure discussed and consent signed.    - calmoseptine BID to perianal area for barrier cream      Lilliam Curtis MD  Staff Surgeon  Colon & Rectal Surgery

## 2024-11-18 ENCOUNTER — OFFICE VISIT (OUTPATIENT)
Dept: SURGERY | Facility: CLINIC | Age: 49
End: 2024-11-18
Payer: COMMERCIAL

## 2024-11-18 VITALS
DIASTOLIC BLOOD PRESSURE: 79 MMHG | SYSTOLIC BLOOD PRESSURE: 118 MMHG | HEART RATE: 72 BPM | WEIGHT: 198.63 LBS | BODY MASS INDEX: 30.1 KG/M2 | HEIGHT: 68 IN

## 2024-11-18 DIAGNOSIS — K64.4 EXTERNAL HEMORRHOIDS: Primary | ICD-10-CM

## 2024-11-18 DIAGNOSIS — K62.9 ANAL LESION: ICD-10-CM

## 2024-11-18 PROCEDURE — 99999 PR PBB SHADOW E&M-EST. PATIENT-LVL IV: CPT | Mod: PBBFAC,,, | Performed by: SURGERY

## 2024-11-18 PROCEDURE — 99213 OFFICE O/P EST LOW 20 MIN: CPT | Mod: S$GLB,,, | Performed by: SURGERY

## 2024-11-21 ENCOUNTER — OFFICE VISIT (OUTPATIENT)
Dept: PRIMARY CARE CLINIC | Facility: CLINIC | Age: 49
End: 2024-11-21
Payer: COMMERCIAL

## 2024-11-21 VITALS
DIASTOLIC BLOOD PRESSURE: 80 MMHG | HEIGHT: 67 IN | SYSTOLIC BLOOD PRESSURE: 110 MMHG | HEART RATE: 74 BPM | WEIGHT: 198.19 LBS | BODY MASS INDEX: 31.11 KG/M2 | OXYGEN SATURATION: 97 %

## 2024-11-21 DIAGNOSIS — L73.9 FOLLICULITIS: Primary | ICD-10-CM

## 2024-11-21 PROCEDURE — 99999 PR PBB SHADOW E&M-EST. PATIENT-LVL III: CPT | Mod: PBBFAC,,, | Performed by: NURSE PRACTITIONER

## 2024-11-21 PROCEDURE — 99214 OFFICE O/P EST MOD 30 MIN: CPT | Mod: S$GLB,,, | Performed by: NURSE PRACTITIONER

## 2024-11-21 RX ORDER — DOXYCYCLINE HYCLATE 100 MG
100 TABLET ORAL EVERY 12 HOURS
Qty: 14 TABLET | Refills: 0 | Status: SHIPPED | OUTPATIENT
Start: 2024-11-21 | End: 2024-11-28

## 2024-11-21 NOTE — PROGRESS NOTES
[facility] Note    Chief Complaint      Chief Complaint   Patient presents with    Rash     About a month        History of Present Illness      History of Present Illness    CHIEF COMPLAINT:  Cayden presents today for evaluation of a facial rash.    HISTORY OF PRESENT ILLNESS:  He reports a facial rash that started approximately one month ago, initially presenting as small white pustules primarily in the mustache and beard area, including one inside a nostril. The rash subsequently spread to other areas of the face. While improved, the rash persists, though he denies any current pustules. He previously used a topical steroid, which provided initial relief but led to a flare-up upon discontinuation. He was also prescribed mupirocin topically for two weeks, which resulted in a scaly appearance. He stopped these treatments about a week ago. He reports washing his face vigorously due to oily skin, using Biore for blackheads, and has recently switched to a paraben-free shampoo as a body wash. He previously used alcohol wipes for cleaning but has discontinued their use.    MEDICAL HISTORY:  He is a 49-year-old male with a history of staph infections, including folliculitis. He recently underwent a colonoscopy and endoscopy approximately one month ago, which he reports has caused significant systemic inflammation and exacerbation of his symptoms. He is highly sensitive to medications in general, reporting poor tolerance to oral steroids and avoidance of steroid injections. He also mentions poor response to pain medications and anesthesia.      ROS:  Musculoskeletal: -joint pain  Skin: +rash         Past Medical History:  Past Medical History:   Diagnosis Date    HTN (hypertension), benign 5/16/2024    Synovitis of hip     right       Past Surgical History:   has a past surgical history that includes Hip surgery; Esophagogastroduodenoscopy (N/A, 09/26/2024); Colonoscopy (N/A, 09/26/2024); and Joint  replacement.    Family History:  family history includes Heart disease in his paternal grandfather; No Known Problems in his mother; Pacemaker/defibrilator in his maternal grandfather; Prostate cancer in his father and maternal uncle; Transient ischemic attack in his maternal grandmother; Ulcerative colitis in his sister.     Social History:  Social History     Tobacco Use    Smoking status: Former     Current packs/day: 0.00     Types: Cigarettes     Quit date: 1996     Years since quittin.4    Smokeless tobacco: Never   Substance Use Topics    Alcohol use: Yes     Alcohol/week: 3.0 standard drinks of alcohol     Types: 3 Glasses of wine per week     Comment: socially    Drug use: No         Medications:  Outpatient Encounter Medications as of 2024   Medication Sig Dispense Refill    benazepriL (LOTENSIN) 20 MG tablet Take 1 tablet (20 mg total) by mouth once daily. 90 tablet 3    [] doxycycline (VIBRA-TABS) 100 MG tablet Take 1 tablet (100 mg total) by mouth every 12 (twelve) hours. for 7 days 14 tablet 0    mometasone 0.1% (ELOCON) 0.1 % cream Apply topically once daily. (Patient not taking: Reported on 2024) 45 g 0    mupirocin (BACTROBAN) 2 % ointment Apply topically 3 (three) times daily. (Patient not taking: Reported on 2024) 30 g 0    pantoprazole (PROTONIX) 40 MG tablet Take 1 tablet (40 mg total) by mouth 2 (two) times daily. 90 tablet 3    rosuvastatin (CRESTOR) 10 MG tablet Take 1 tablet (10 mg total) by mouth once daily. 90 tablet 3     No facility-administered encounter medications on file as of 2024.       Allergies:  Review of patient's allergies indicates:   Allergen Reactions    Adhesive Other (See Comments)     Severe rash     Amoxicillin Nausea And Vomiting    Gabapentin Other (See Comments)     Blurry vision    Morphine Other (See Comments)    Oxycodone-acetaminophen Other (See Comments)       Health Maintenance:    There is no immunization history on  "file for this patient.   Health Maintenance   Topic Date Due    Pneumococcal Vaccines (Age 0-64) (1 of 2 - PCV) Never done    TETANUS VACCINE  Never done    Shingles Vaccine (1 of 2) Never done    Influenza Vaccine (1) Never done    COVID-19 Vaccine (1 - 2024-25 season) Never done    Colorectal Cancer Screening  09/26/2025    Hemoglobin A1c (Diabetic Prevention Screening)  05/16/2027    Lipid Panel  05/16/2029    RSV Vaccine (Age 60+ and Pregnant patients) (1 - 1-dose 75+ series) 04/12/2049    Hepatitis C Screening  Completed    HIV Screening  Completed        Physical Exam      Vital Signs  Pulse: 74  SpO2: 97 %  BP: 110/80  BP Location: Left arm  Patient Position: Sitting  Pain Score: 0-No pain  Height and Weight  Height: 5' 7" (170.2 cm)  Weight: 89.9 kg (198 lb 3.1 oz)  BSA (Calculated - sq m): 2.06 sq meters  BMI (Calculated): 31  Weight in (lb) to have BMI = 25: 159.3]    Physical Exam  Constitutional:       Appearance: He is well-developed.   HENT:      Head: Normocephalic and atraumatic.   Neck:      Thyroid: No thyromegaly.   Cardiovascular:      Rate and Rhythm: Normal rate and regular rhythm.      Heart sounds: No murmur heard.  Pulmonary:      Effort: Pulmonary effort is normal. No respiratory distress.      Breath sounds: Normal breath sounds.   Abdominal:      Palpations: Abdomen is soft.   Skin:     General: Skin is warm and dry.   Neurological:      Mental Status: He is alert and oriented to person, place, and time.   Psychiatric:         Behavior: Behavior normal.          Laboratory:  CBC:  Recent Labs   Lab 05/16/24  0943 09/13/24  1142   WBC 7.01 7.32   RBC 4.89 4.87   Hemoglobin 15.7 15.5   Hematocrit 47.0 46.5   Platelets 218 246   MCV 96 96   MCH 32.1 H 31.8 H   MCHC 33.4 33.3     CMP:  Recent Labs   Lab 05/16/24  0943 09/13/24  1142   Glucose 91 98   Calcium 10.3 10.2   Albumin 4.4 4.4   Total Protein 7.8 7.7   Sodium 139 139   Potassium 4.6 4.6   CO2 28 26   Chloride 104 104   BUN 21 H 21 H "   Alkaline Phosphatase 49 L 51 L   ALT 52 H 31   AST 26 18   Total Bilirubin 0.7 0.5     URINALYSIS:       LIPIDS:  Recent Labs   Lab 05/16/24  0943   TSH 1.077   HDL 54   Cholesterol 228 H   Triglycerides 159 H   LDL Cholesterol 142.2   HDL/Cholesterol Ratio 23.7   Non-HDL Cholesterol 174   Total Cholesterol/HDL Ratio 4.2     TSH:  Recent Labs   Lab 05/16/24  0943   TSH 1.077     A1C:  Recent Labs   Lab 05/16/24  0943   Hemoglobin A1C 5.4       Assessment/Plan     Cayden Reza is a 49 y.o.male with:    Assessment & Plan    Assessed facial rash, likely folliculitis with possible staph component  Considered steroid withdrawal as contributing factor to current presentation  Opted for oral antibiotic trial and topical antimicrobial to address potential staph colonization    FOLLICULAR DISORDER:  - Explained potential for steroid withdrawal effects following use of potent topical steroid on face.  - Discussed importance of allowing skin to heal without irritation from shaving.  - Advised on risks of self-medicating with steroids, particularly on facial skin.  - Christopher to stop shaving for at least 1 week.  - Christopher to discontinue use of alcohol wipes and aggressive face washing.  - Christopher to avoid touching or irritating affected areas.  - Started doxycycline twice daily for 1 week.  - Started mupirocin (Bactroban) applied to nasal vestibules with Q-tip daily for 2 weeks.    FOLLOW-UP:  - Contact the office if unable to tolerate doxycycline within 48 hours.  - Follow up if condition does not improve, may require dermatology referral.         1. Folliculitis  - doxycycline (VIBRA-TABS) 100 MG tablet; Take 1 tablet (100 mg total) by mouth every 12 (twelve) hours. for 7 days  Dispense: 14 tablet; Refill: 0       Chronic conditions status updated as per HPI.  Other than changes above, cont current medications and maintain follow up with specialists.  No follow-ups on file.    Future Appointments   Date  Time Provider Department Center   12/2/2024  7:30 AM PRE-ADMIT, [facility]   12/20/2024  9:20 AM Laura Alejandra, NP [facility] [place]   1/6/2025  8:00 AM Lilliam Curtis MD [facility]       This note was generated with the assistance of ambient listening technology. Verbal consent was obtained by the patient and accompanying visitor(s) for the recording of patient appointment to facilitate this note. I attest to having reviewed and edited the generated note for accuracy, though some syntax or spelling errors may persist. Please contact the author of this note for any clarification.      Adrienne Cotaya, FNP [facility]

## 2024-12-02 ENCOUNTER — ANESTHESIA EVENT (OUTPATIENT)
Dept: SURGERY | Facility: OTHER | Age: 49
End: 2024-12-02
Payer: COMMERCIAL

## 2024-12-02 ENCOUNTER — HOSPITAL ENCOUNTER (OUTPATIENT)
Dept: PREADMISSION TESTING | Facility: OTHER | Age: 49
Discharge: HOME | End: 2024-12-02
Attending: SURGERY
Payer: COMMERCIAL

## 2024-12-02 VITALS
TEMPERATURE: 98 F | HEART RATE: 67 BPM | WEIGHT: 198 LBS | BODY MASS INDEX: 31.08 KG/M2 | HEIGHT: 67 IN | DIASTOLIC BLOOD PRESSURE: 82 MMHG | SYSTOLIC BLOOD PRESSURE: 137 MMHG | RESPIRATION RATE: 16 BRPM | OXYGEN SATURATION: 98 %

## 2024-12-02 RX ORDER — SODIUM CHLORIDE, SODIUM LACTATE, POTASSIUM CHLORIDE, CALCIUM CHLORIDE 600; 310; 30; 20 MG/100ML; MG/100ML; MG/100ML; MG/100ML
INJECTION, SOLUTION INTRAVENOUS CONTINUOUS
Status: CANCELLED | OUTPATIENT
Start: 2024-12-02

## 2024-12-02 RX ORDER — LIDOCAINE HYDROCHLORIDE 10 MG/ML
0.5 INJECTION, SOLUTION EPIDURAL; INFILTRATION; INTRACAUDAL; PERINEURAL ONCE
Status: CANCELLED | OUTPATIENT
Start: 2024-12-02 | End: 2024-12-02

## 2024-12-02 NOTE — DISCHARGE INSTRUCTIONS
Information to Prepare you for your Surgery    PRE-ADMIT TESTING   [address]  [place] [address]  [address]     Your surgery has been scheduled at [facility]. We are pleased to have the opportunity to serve you. For Further Information please call [phone].    On the day of surgery please report to Registration on the 1st floor of the [facility].    CONTACT YOUR PHYSICIAN'S OFFICE THE DAY PRIOR TO YOUR SURGERY TO OBTAIN YOUR ARRIVAL TIME.     The evening before surgery do not eat anything after 9 p.m. ( this includes hard candy, chewing gum and mints).  You may only have GATORADE, POWERADE AND WATER  from 9 p.m. until you leave your home.   DO NOT DRINK ANY LIQUIDS ON THE WAY TO THE HOSPITAL.      Why does your anesthesiologist allow you to drink Gatorade/Powerade before surgery?  Gatorade/Powerade helps to increase your comfort before surgery and to decrease your nausea after surgery.   The carbohydrates in Gatorade/Powerade help reduce your body's stress response to surgery.  If you are a diabetic-drink only water prior to surgery.    Outpatient Surgery- May allow 2 adults (18 and older)/ Support Persons (1 being the designated ) for all surgical/procedural patients. A breastfeeding mother will be allowed her infant and 2 adult Support Persons. No one under the age of 18 will be allowed in the building.    MEDICATION INSTRUCTIONS: TAKE medications checked off by the Anesthesiologist on your Medication List.    Surgery Patients:  If you take ASPIRIN - Your PHYSICIAN/SURGEON will need to inform you IF/OR when you need to stop taking aspirin prior to your surgery.     Starting the week prior to surgery, do not take any medications containing IBUPROFEN or NSAIDS (Advil, Aleve, BC, Celebrex, Goody's, Ketorolac, Meloxicam, Mobic, Motrin, Naproxen, Toradol, etc).  If you are not sure if you should take a medicine please call your surgeon's office.  You may take Tylenol.    Do  Not Wear any make-up (especially eye make-up) to surgery. Please remove any false eyelashes or eyelash extensions. If you arrive the day of surgery with makeup/eyelashes on you will be required to remove prior to surgery. (There is a risk of corneal abrasions if eye makeup/eyelash extensions are not removed)    Leave all valuables at home.   Do Not wear any jewelry or watches, including any metal in body piercings. Jewelry must be removed prior to coming to the hospital.  There is a possibility that rings that are unable to be removed may be cut off if they are on the surgical extremity.    Please remove all hair extensions, wigs, clips and any other metal accessories/ ornaments from your hair.  These items may pose a flammable/fire risk in Surgery and must be removed.    Do not shave your surgical area at least 5 days prior to your surgery. The surgical prep will be performed at the hospital according to Infection Control regulations.    Contact Lens must be removed before surgery. Either do not wear the contact lens or bring a case and solution for storage.  Please bring a container for eyeglasses or dentures as required.  Bring any paperwork your physician has provided, such as consent forms,  history and physicals, doctor's orders, etc.   Bring comfortable clothes that are loose fitting to wear upon discharge. Take into consideration the type of surgery being performed.  Maintain your diet as advised per your physician the day prior to surgery.    Adequate rest the night before surgery is advised.   Park in the Parking lot behind the hospital or in the [facility] across the street from the parking lot. Parking is complimentary.  If you will be discharged the same day as your procedure, please arrange for a responsible adult to drive you home or to accompany you if traveling by taxi.   YOU WILL NOT BE PERMITTED TO DRIVE OR TO LEAVE THE HOSPITAL ALONE AFTER SURGERY.   If you are being discharged the  same day, it is strongly recommended that you arrange for someone to remain with you for the first 24 hrs following your surgery.    The Surgeon will speak to your family/visitor after your surgery regarding the outcome of your surgery and post op care.  The Surgeon may speak to you after your surgery, but there is a possibility you may not remember the details.  Please check with your family members regarding the conversation with the Surgeon.    We strongly recommend whoever is bringing you home be present for discharge instructions.  This will ensure a thorough understanding for your post op home care.              Bathing Instructions with Hibiclens  Shower the evening before and morning of your procedure with Chlorhexidine (Hibiclens)    Do not use Chlorhexidine on your face or genitals. Do not get in your eyes.  Wash your face with water and your regular face wash/soap  Use your regular shampoo  Apply Chlorhexidine (Hibiclens) directly on your skin or on a wet washcloth and wash gently. When showering: Move away from the shower stream when applying Chlorhexidine (Hibiclens) to avoid rinsing off too soon.  Rinse thoroughly with warm water  Do not dilute Chlorhexidine (Hibiclens)   Dry off as usual, do not use any deodorant, powder, body lotions, perfume, after shave or cologne.     If the patient has fever, cough, or signs/symptoms of Flu or Covid please do not come in for your surgery.   Contact your surgeon and your primary care physician for further instructions.   Please also call [facility] Outpatient Surgery [phone]. The unit opens at 5 AM.    If applicable, please bring your blood pressure & diabetes medications the day of surgery.

## 2024-12-02 NOTE — ANESTHESIA PREPROCEDURE EVALUATION
12/02/2024  Cayden Reza is a 49 y.o., male.      Pre-op Assessment    I have reviewed the Patient Summary Reports.     I have reviewed the Nursing Notes. I have reviewed the NPO Status.   I have reviewed the Medications.     Review of Systems  Anesthesia Hx:  No previous Anesthesia   History of prior surgery of interest to airway management or planning:  Previous anesthesia: General Hip surg with general anesthesia.        Denies Family Hx of Anesthesia complications.    Denies Personal Hx of Anesthesia complications.                    Social:  Non-Smoker, Former Smoker, Social Alcohol Use       Hematology/Oncology:  Hematology Normal   Oncology Normal                                   EENT/Dental:  EENT/Dental Normal           Cardiovascular:     Hypertension, well controlled           hyperlipidemia   ECG has been reviewed.                            Pulmonary:  Pulmonary Normal                       Renal/:  Renal/ Normal                 Hepatic/GI:     GERD, well controlled                Musculoskeletal:         Spine Disorders: lumbar Chronic Pain and Degenerative disease           Neurological:  Neurology Normal                                      Endocrine:  Endocrine Normal          Obesity / BMI > 30  Dermatological:  Skin Normal    Psych:  Psychiatric Normal                  Physical Exam  General: Cooperative, Alert and Oriented    Airway:  Mallampati: II   Mouth Opening: Normal  Neck ROM: Normal ROM    Dental:  Intact    Anesthesia Plan  Type of Anesthesia, risks & benefits discussed:    Anesthesia Type: Gen Supraglottic Airway, Gen ETT  Intra-op Monitoring Plan: Standard ASA Monitors  Post Op Pain Control Plan: multimodal analgesia  Induction:  IV  Informed Consent: Informed consent signed with the Patient and all parties understand the risks and agree with anesthesia plan.  All  questions answered.   ASA Score: 2  Anesthesia Plan Notes: Labs ok in epic    Ready For Surgery From Anesthesia Perspective.     .

## 2024-12-04 ENCOUNTER — TELEPHONE (OUTPATIENT)
Dept: SURGERY | Facility: CLINIC | Age: 49
End: 2024-12-04
Payer: COMMERCIAL

## 2024-12-06 ENCOUNTER — HOSPITAL ENCOUNTER (OUTPATIENT)
Facility: OTHER | Age: 49
Discharge: HOME | End: 2024-12-06
Attending: SURGERY | Admitting: SURGERY
Payer: COMMERCIAL

## 2024-12-06 ENCOUNTER — ANESTHESIA (OUTPATIENT)
Dept: SURGERY | Facility: OTHER | Age: 49
End: 2024-12-06
Payer: COMMERCIAL

## 2024-12-06 DIAGNOSIS — K62.9 ANAL LESION: Primary | ICD-10-CM

## 2024-12-06 PROCEDURE — 25000003 PHARM REV CODE 250: Performed by: ANESTHESIOLOGY

## 2024-12-06 PROCEDURE — 63600175 PHARM REV CODE 636 W HCPCS: Performed by: ANESTHESIOLOGY

## 2024-12-06 PROCEDURE — 46255 REMOVE INT/EXT HEM 1 GROUP: CPT | Mod: ,,, | Performed by: SURGERY

## 2024-12-06 PROCEDURE — 71000039 HC RECOVERY, EACH ADD'L HOUR: Performed by: SURGERY

## 2024-12-06 PROCEDURE — 88305 TISSUE EXAM BY PATHOLOGIST: CPT | Performed by: STUDENT IN AN ORGANIZED HEALTH CARE EDUCATION/TRAINING PROGRAM

## 2024-12-06 PROCEDURE — 37000009 HC ANESTHESIA EA ADD 15 MINS: Performed by: SURGERY

## 2024-12-06 PROCEDURE — 63600175 PHARM REV CODE 636 W HCPCS: Mod: JZ,JG | Performed by: SURGERY

## 2024-12-06 PROCEDURE — 36000706: Performed by: SURGERY

## 2024-12-06 PROCEDURE — 37000008 HC ANESTHESIA 1ST 15 MINUTES: Performed by: SURGERY

## 2024-12-06 PROCEDURE — 71000016 HC POSTOP RECOV ADDL HR: Performed by: SURGERY

## 2024-12-06 PROCEDURE — 63600175 PHARM REV CODE 636 W HCPCS: Performed by: STUDENT IN AN ORGANIZED HEALTH CARE EDUCATION/TRAINING PROGRAM

## 2024-12-06 PROCEDURE — 46922 EXCISION OF ANAL LESION(S): CPT | Mod: 51,,, | Performed by: SURGERY

## 2024-12-06 PROCEDURE — 71000033 HC RECOVERY, INTIAL HOUR: Performed by: SURGERY

## 2024-12-06 PROCEDURE — 36000707: Performed by: SURGERY

## 2024-12-06 PROCEDURE — 71000015 HC POSTOP RECOV 1ST HR: Performed by: SURGERY

## 2024-12-06 RX ORDER — ONDANSETRON HYDROCHLORIDE 2 MG/ML
INJECTION, SOLUTION INTRAVENOUS
Status: DISCONTINUED | OUTPATIENT
Start: 2024-12-06 | End: 2024-12-06

## 2024-12-06 RX ORDER — SODIUM CHLORIDE 0.9 % (FLUSH) 0.9 %
3 SYRINGE (ML) INJECTION
Status: DISCONTINUED | OUTPATIENT
Start: 2024-12-06 | End: 2024-12-06 | Stop reason: HOSPADM

## 2024-12-06 RX ORDER — TRAMADOL HYDROCHLORIDE 50 MG/1
50 TABLET ORAL EVERY 6 HOURS PRN
Qty: 20 TABLET | Refills: 0 | Status: SHIPPED | OUTPATIENT
Start: 2024-12-06

## 2024-12-06 RX ORDER — PROPOFOL 10 MG/ML
VIAL (ML) INTRAVENOUS
Status: DISCONTINUED | OUTPATIENT
Start: 2024-12-06 | End: 2024-12-06

## 2024-12-06 RX ORDER — SODIUM CHLORIDE, SODIUM LACTATE, POTASSIUM CHLORIDE, CALCIUM CHLORIDE 600; 310; 30; 20 MG/100ML; MG/100ML; MG/100ML; MG/100ML
INJECTION, SOLUTION INTRAVENOUS CONTINUOUS
Status: DISCONTINUED | OUTPATIENT
Start: 2024-12-06 | End: 2024-12-06 | Stop reason: HOSPADM

## 2024-12-06 RX ORDER — DEXAMETHASONE SODIUM PHOSPHATE 4 MG/ML
INJECTION, SOLUTION INTRA-ARTICULAR; INTRALESIONAL; INTRAMUSCULAR; INTRAVENOUS; SOFT TISSUE
Status: DISCONTINUED | OUTPATIENT
Start: 2024-12-06 | End: 2024-12-06

## 2024-12-06 RX ORDER — TRAMADOL HYDROCHLORIDE 50 MG/1
50 TABLET ORAL ONCE
Status: COMPLETED | OUTPATIENT
Start: 2024-12-06 | End: 2024-12-06

## 2024-12-06 RX ORDER — LIDOCAINE HYDROCHLORIDE 10 MG/ML
0.5 INJECTION, SOLUTION EPIDURAL; INFILTRATION; INTRACAUDAL; PERINEURAL ONCE
Status: DISCONTINUED | OUTPATIENT
Start: 2024-12-06 | End: 2024-12-06 | Stop reason: HOSPADM

## 2024-12-06 RX ORDER — BUPIVACAINE HYDROCHLORIDE 2.5 MG/ML
INJECTION, SOLUTION EPIDURAL; INFILTRATION; INTRACAUDAL
Status: DISCONTINUED | OUTPATIENT
Start: 2024-12-06 | End: 2024-12-06 | Stop reason: HOSPADM

## 2024-12-06 RX ORDER — DIPHENHYDRAMINE HYDROCHLORIDE 50 MG/ML
25 INJECTION INTRAMUSCULAR; INTRAVENOUS EVERY 6 HOURS PRN
Status: DISCONTINUED | OUTPATIENT
Start: 2024-12-06 | End: 2024-12-06 | Stop reason: HOSPADM

## 2024-12-06 RX ORDER — GLUCAGON 1 MG
1 KIT INJECTION
Status: DISCONTINUED | OUTPATIENT
Start: 2024-12-06 | End: 2024-12-06 | Stop reason: HOSPADM

## 2024-12-06 RX ORDER — LIDOCAINE HYDROCHLORIDE 20 MG/ML
INJECTION INTRAVENOUS
Status: DISCONTINUED | OUTPATIENT
Start: 2024-12-06 | End: 2024-12-06

## 2024-12-06 RX ORDER — MEPERIDINE HYDROCHLORIDE 25 MG/ML
12.5 INJECTION INTRAMUSCULAR; INTRAVENOUS; SUBCUTANEOUS ONCE AS NEEDED
Status: DISCONTINUED | OUTPATIENT
Start: 2024-12-06 | End: 2024-12-06 | Stop reason: HOSPADM

## 2024-12-06 RX ORDER — PROCHLORPERAZINE EDISYLATE 5 MG/ML
5 INJECTION INTRAMUSCULAR; INTRAVENOUS EVERY 30 MIN PRN
Status: DISCONTINUED | OUTPATIENT
Start: 2024-12-06 | End: 2024-12-06 | Stop reason: HOSPADM

## 2024-12-06 RX ORDER — KETOROLAC TROMETHAMINE 30 MG/ML
INJECTION, SOLUTION INTRAMUSCULAR; INTRAVENOUS
Status: DISCONTINUED | OUTPATIENT
Start: 2024-12-06 | End: 2024-12-06

## 2024-12-06 RX ORDER — DIPHENHYDRAMINE HYDROCHLORIDE 50 MG/ML
INJECTION INTRAMUSCULAR; INTRAVENOUS
Status: DISCONTINUED | OUTPATIENT
Start: 2024-12-06 | End: 2024-12-06

## 2024-12-06 RX ORDER — HYDROMORPHONE HYDROCHLORIDE 2 MG/ML
0.4 INJECTION, SOLUTION INTRAMUSCULAR; INTRAVENOUS; SUBCUTANEOUS EVERY 5 MIN PRN
Status: DISCONTINUED | OUTPATIENT
Start: 2024-12-06 | End: 2024-12-06 | Stop reason: HOSPADM

## 2024-12-06 RX ORDER — FENTANYL CITRATE 50 UG/ML
INJECTION, SOLUTION INTRAMUSCULAR; INTRAVENOUS
Status: DISCONTINUED | OUTPATIENT
Start: 2024-12-06 | End: 2024-12-06

## 2024-12-06 RX ADMIN — TRAMADOL HYDROCHLORIDE 50 MG: 50 TABLET, COATED ORAL at 01:12

## 2024-12-06 RX ADMIN — FENTANYL CITRATE 100 MCG: 50 INJECTION, SOLUTION INTRAMUSCULAR; INTRAVENOUS at 12:12

## 2024-12-06 RX ADMIN — ONDANSETRON HYDROCHLORIDE 4 MG: 2 INJECTION INTRAMUSCULAR; INTRAVENOUS at 12:12

## 2024-12-06 RX ADMIN — PROPOFOL 200 MG: 10 INJECTION, EMULSION INTRAVENOUS at 12:12

## 2024-12-06 RX ADMIN — DEXAMETHASONE SODIUM PHOSPHATE 8 MG: 4 INJECTION, SOLUTION INTRAMUSCULAR; INTRAVENOUS at 12:12

## 2024-12-06 RX ADMIN — DIPHENHYDRAMINE HYDROCHLORIDE 25 MG: 50 INJECTION, SOLUTION INTRAMUSCULAR; INTRAVENOUS at 12:12

## 2024-12-06 RX ADMIN — HYDROMORPHONE HYDROCHLORIDE 0.4 MG: 2 INJECTION INTRAMUSCULAR; INTRAVENOUS; SUBCUTANEOUS at 01:12

## 2024-12-06 RX ADMIN — LIDOCAINE HYDROCHLORIDE 100 MG: 20 INJECTION, SOLUTION INTRAVENOUS at 12:12

## 2024-12-06 RX ADMIN — KETOROLAC TROMETHAMINE 30 MG: 30 INJECTION, SOLUTION INTRAMUSCULAR; INTRAVENOUS at 12:12

## 2024-12-06 RX ADMIN — SODIUM CHLORIDE, SODIUM LACTATE, POTASSIUM CHLORIDE, AND CALCIUM CHLORIDE: 600; 310; 30; 20 INJECTION, SOLUTION INTRAVENOUS at 12:12

## 2024-12-06 NOTE — ANESTHESIA POSTPROCEDURE EVALUATION
Anesthesia Post Evaluation    Patient: Cayden Reza    Procedure(s) Performed: Procedure(s) (LRB):  EXCISION, LESION, ANUS (N/A)    Final Anesthesia Type: general      Patient location during evaluation: PACU  Patient participation: Yes- Able to Participate  Level of consciousness: awake and alert  Post-procedure vital signs: reviewed and stable  Pain management: adequate  Airway patency: patent  RYAN mitigation strategies: Extubation while patient is awake  PONV status at discharge: No PONV  Anesthetic complications: no      Cardiovascular status: hemodynamically stable  Respiratory status: unassisted  Hydration status: euvolemic  Follow-up not needed.              Vitals Value Taken Time   /66 12/06/24 1403   Temp 36.3 °C (97.4 °F) 12/06/24 1400   Pulse 60 12/06/24 1404   Resp 14 12/06/24 1400   SpO2 97 % 12/06/24 1404   Vitals shown include unfiled device data.      Event Time   Out of Recovery 14:06:00         Pain/Andreina Score: Pain Rating Prior to Med Admin: 7 (12/6/2024  1:46 PM)  Pain Rating Post Med Admin: 7 (12/6/2024  1:43 PM)  Andreina Score: 10 (12/6/2024  2:10 PM)

## 2024-12-06 NOTE — OP NOTE
Date of surgery: 12/06/2024    Operative Report  Pre-operative diagnosis: anal lesion   Post-operative diagnosis: Same as above    Procedure:  Anal exam under anesthesia with excision of anal lesions    Findings:  Internal external hemorrhoid column with pedunculated lesion   Hypertrophied anal papilla       Surgeon: Lilliam Curtis MD   Assistant:  None    Indication: Mr. 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 The benefits, risks, and alternatives were discussed with the patient, they were given the opportunity to ask questions and they elected to proceed with operative intervention after signing written consent.    Procedure:  After pre-operative assessment and review of informed consent, the patient was taken to the operating room and received general anesthesia. Pre-operative antibiotics were administered if indicated and the patient was placed in lithotomy position. The perineum was prepped and draped in the usual sterile fashion and a timeout was performed according to [facility] Quality and Safety guidelines.      A perianal block was performed.  Anoscopy was performed.  There were hypertrophied anal papilla in the anal canal.  There was a small papilla in the left lateral position with mild polypoid changes at the tip.  This was excised and set for pathology.  There was an internal and external hemorrhoid column with associated polypoid lesion just to the right of posterior midline.  The column was grasped at the anal verge.  It was dissected off of the sphincter complex.  The internal hemorrhoid was clamped with a christa and excised.  This was sent with the previous specimen.  The apex of the hemorrhoid was ligated with a figure of eight vicryl suture.  The remaining resection bed was cauterized and hemostasis was obtained.  There was an additional hemorrhoid skin tag in the  posterior midline that was excised.  The anal canal was irrigated and hemostasis was appropriate.      Prior to closure and after final closure instrument, needle, and sponge counts were correct.    The procedure was completed without complication and was well-tolerated. The patient was then brought to the post-anesthesia care unit in stable condition. I was present for the entire operation.    Complications: None  Estimated blood loss: 10 mL  Disposition: PACU    Lilliam Curtis MD, FACS  Staff Surgeon   Colon & Rectal Surgery

## 2024-12-06 NOTE — BRIEF OP NOTE
[facility] ([place])  Brief Operative Note    Surgery Date: 12/6/2024     Surgeons and Role:     * Lilliam Curtis MD - Primary    Assisting Surgeon: None    Pre-op Diagnosis:  Anal lesion [K62.9]    Post-op Diagnosis:  Post-Op Diagnosis Codes:     * Anal lesion [K62.9]    Procedure(s) (LRB):  EXCISION, LESION, ANUS (N/A)    Anesthesia: Choice    Operative Findings: right posterolateral hemorrhoid column with associated pedunculated lesion     Estimated Blood Loss: 10 cc         Specimens:   Specimen (24h ago, onward)       Start     Ordered    12/06/24 1254  Specimen to Pathology, Surgery Other (Colorectal)  Once        Comments: Pre-op Diagnosis: Anal lesion [K62.9]Procedure(s):EXCISION, LESION, ANUS Number of specimens: 1Name of specimens: 1) Anal lesions - PERM     References:    Click here for ordering Quick Tip   Question Answer Comment   Procedure Type: Other Colorectal   Specimen Class: Routine/Screening    Release to patient Immediate        12/06/24 1302                      Discharge Note    OUTCOME: Patient tolerated treatment/procedure well without complication and is now ready for discharge.    DISPOSITION: Home or Self Care    FINAL DIAGNOSIS:  anal lesion    FOLLOWUP: In clinic    DISCHARGE INSTRUCTIONS:    Anal Surgery Post Op Instructions:    1. Take tylenol and ibuprofen for pain.  Take prescription medication for breakthrough pain.  Take sitz baths as needed for comfort.   2. Take capful of miralax daily while taking pain medication.  Increase to once capful twice a day if no bowel movement in 24-48 hours.   3.  Some blood per rectum is expected after this procedure and may last for 3-4 weeks post op.  Call if bleeding is > 1 cup.   4. Call [phone] for worsening pain, inability to urinate or fever > 101.  Call or schedule follow up via [facility] for 4 weeks after surgery     Lilliam Curtis MD  Staff Surgeon  Colon & Rectal Surgery

## 2024-12-06 NOTE — DISCHARGE INSTRUCTIONS
Sitz bath as needed for Comfort  Maintain high fiber diet to promote soft stools  Stool softer miralax suggestion for stool softner to avoid constipation  Rinse with warm water by using mehul bottle after each bowel movement  Pat dry can use hair dryer to keep area clean and dry use on cool setting   Watch for signs of infection redness swelling or drainage at incision site  Call MD for fever greater than 100 degrees

## 2024-12-06 NOTE — TRANSFER OF CARE
"Anesthesia Transfer of Care Note    Patient: Cayden Reza    Procedure(s) Performed: Procedure(s) (LRB):  EXCISION, LESION, ANUS (N/A)    Anesthesia Type: general    Transport from OR: Transported from OR on 6-10 L/min O2 by face mask with adequate spontaneous ventilation    Post pain: adequate analgesia    Post assessment: no apparent anesthetic complications and tolerated procedure well    Post vital signs: stable    Level of consciousness: awake    Nausea/Vomiting: no nausea/vomiting    Complications: none    Transfer of care protocol was followed      Last vitals: Visit Vitals  BP (!) 135/91 (BP Location: Right arm, Patient Position: Lying)   Pulse 77   Temp 36.7 °C (98.1 °F) (Oral)   Resp 18   Ht 5' 7" (1.702 m)   Wt 89.8 kg (198 lb)   SpO2 99%   BMI 31.01 kg/m²     "

## 2024-12-06 NOTE — OR NURSING
Bedside report and handoff given to Dilma ALSTON.  Pt is awake and alert.  States pain is tolerable

## 2024-12-06 NOTE — ANESTHESIA PROCEDURE NOTES
Intubation    Date/Time: 12/6/2024 12:45 PM    Performed by: Matias Cardona CRNA  Authorized by: Trevon Bird MD    Intubation:     Induction:  Intravenous    Intubated:  Postinduction    Mask Ventilation:  Not attempted    Attempts:  1    Attempted By:  CRNA    Laryngeal View Grade: Grade I - full view of cords      Difficult Airway Encountered?: No      Complications:  None    Airway Device:  Supraglottic airway/LMA    Airway Device Size:  4.0    Style/Cuff Inflation:  Uncuffed    Placement Verified By:  Capnometry    Complicating Factors:  None    Findings Post-Intubation:  BS equal bilateral and atraumatic/condition of teeth unchanged

## 2024-12-06 NOTE — PLAN OF CARE
Cayden Reza has met all discharge criteria from Phase II. Vital Signs are stable, ambulating  without difficulty. Discharge instructions given, patient verbalized understanding. Discharged from facility via wheelchair in stable condition.

## 2024-12-09 VITALS
WEIGHT: 198 LBS | HEIGHT: 67 IN | RESPIRATION RATE: 18 BRPM | BODY MASS INDEX: 31.08 KG/M2 | OXYGEN SATURATION: 98 % | SYSTOLIC BLOOD PRESSURE: 120 MMHG | TEMPERATURE: 98 F | HEART RATE: 72 BPM | DIASTOLIC BLOOD PRESSURE: 78 MMHG

## 2024-12-09 LAB
FINAL PATHOLOGIC DIAGNOSIS: NORMAL
GROSS: NORMAL
Lab: NORMAL
MICROSCOPIC EXAM: NORMAL

## 2024-12-10 ENCOUNTER — PATIENT MESSAGE (OUTPATIENT)
Dept: SURGERY | Facility: CLINIC | Age: 49
End: 2024-12-10
Payer: COMMERCIAL

## 2024-12-23 DIAGNOSIS — K21.9 GASTROESOPHAGEAL REFLUX DISEASE WITHOUT ESOPHAGITIS: ICD-10-CM

## 2024-12-23 RX ORDER — PANTOPRAZOLE SODIUM 40 MG/1
40 TABLET, DELAYED RELEASE ORAL 2 TIMES DAILY
Qty: 90 TABLET | Refills: 3 | Status: SHIPPED | OUTPATIENT
Start: 2024-12-23 | End: 2025-06-21

## 2025-01-02 ENCOUNTER — TELEPHONE (OUTPATIENT)
Dept: SURGERY | Facility: CLINIC | Age: 50
End: 2025-01-02
Payer: COMMERCIAL

## 2025-01-08 ENCOUNTER — E-VISIT (OUTPATIENT)
Dept: PRIMARY CARE CLINIC | Facility: CLINIC | Age: 50
End: 2025-01-08
Payer: COMMERCIAL

## 2025-01-08 DIAGNOSIS — L73.9 FOLLICULITIS: Primary | ICD-10-CM

## 2025-01-08 RX ORDER — DOXYCYCLINE HYCLATE 100 MG
100 TABLET ORAL 2 TIMES DAILY
Qty: 14 TABLET | Refills: 0 | Status: SHIPPED | OUTPATIENT
Start: 2025-01-08 | End: 2025-01-15

## 2025-01-08 NOTE — PROGRESS NOTES
Patient ID: Cayden Reza is a 49 y.o. male.    Chief Complaint: General Illness (Entered automatically based on patient selection in [employer].) and Rash    The patient initiated a request through [employer] on 1/8/2025 for evaluation and management with a chief complaint of General Illness (Entered automatically based on patient selection in [employer].) and Rash     I evaluated the questionnaire submission on 1/08/2025.    [facility]-Skin Hair Nails    1/8/2025 11:47 AM CST - Filed by Patient   What do you need help with? Skin   What concern do you have about your skin? Other Concern   Do you agree to participate in an E-Visit? Yes   If you have any of the following symptoms, please present to your local emergency room or call 911:  I acknowledge   What is the main issue you would like addressed today? Staphylococcus   Please describe your symptoms Spots on my face   Where is your problem located? Face   How severe are your symptoms? Mild   Have you had these symptoms before? Yes   How long have you been having these symptoms? For one to four weeks   Please list any medications or treatments you have used for your condition and indicate if it was effective or not. Topical   What makes this feel better? ?   What makes this feel worse? ?   Are these symptoms related to a condition that you currently have? I am not sure   What is the condition? ?   When were you last seen for this condition? 11/12/2024   Please describe any probable cause for these symptoms Face pustals   Provide any additional information you feel is important.    Please attach any relevant images or files    Are you able to take your vital signs? No         Encounter Diagnosis   Name Primary?    Folliculitis Yes        No orders of the defined types were placed in this encounter.     Medications Ordered This Encounter   Medications    doxycycline (VIBRA-TABS) 100 MG tablet     Sig: Take 1 tablet (100 mg total) by mouth 2  (two) times daily. for 7 days     Dispense:  14 tablet     Refill:  0        No follow-ups on file.      E-Visit Time Tracking:

## 2025-05-02 DIAGNOSIS — K21.9 GASTROESOPHAGEAL REFLUX DISEASE WITHOUT ESOPHAGITIS: ICD-10-CM

## 2025-05-02 RX ORDER — PANTOPRAZOLE SODIUM 40 MG/1
40 TABLET, DELAYED RELEASE ORAL 2 TIMES DAILY
Qty: 90 TABLET | Refills: 3 | Status: SHIPPED | OUTPATIENT
Start: 2025-05-02 | End: 2025-10-29

## 2025-05-07 ENCOUNTER — PATIENT MESSAGE (OUTPATIENT)
Dept: PRIMARY CARE CLINIC | Facility: CLINIC | Age: 50
End: 2025-05-07
Payer: COMMERCIAL

## 2025-05-08 ENCOUNTER — OFFICE VISIT (OUTPATIENT)
Dept: PRIMARY CARE CLINIC | Facility: CLINIC | Age: 50
End: 2025-05-08
Payer: COMMERCIAL

## 2025-05-08 ENCOUNTER — RESULTS FOLLOW-UP (OUTPATIENT)
Dept: PRIMARY CARE CLINIC | Facility: CLINIC | Age: 50
End: 2025-05-08

## 2025-05-08 ENCOUNTER — LAB VISIT (OUTPATIENT)
Dept: LAB | Facility: HOSPITAL | Age: 50
End: 2025-05-08
Attending: STUDENT IN AN ORGANIZED HEALTH CARE EDUCATION/TRAINING PROGRAM
Payer: COMMERCIAL

## 2025-05-08 VITALS
WEIGHT: 203.69 LBS | DIASTOLIC BLOOD PRESSURE: 80 MMHG | HEART RATE: 67 BPM | SYSTOLIC BLOOD PRESSURE: 118 MMHG | OXYGEN SATURATION: 97 % | BODY MASS INDEX: 31.9 KG/M2

## 2025-05-08 DIAGNOSIS — Z13.1 SCREENING FOR DIABETES MELLITUS: ICD-10-CM

## 2025-05-08 DIAGNOSIS — R53.83 FATIGUE, UNSPECIFIED TYPE: ICD-10-CM

## 2025-05-08 DIAGNOSIS — E78.5 HYPERLIPIDEMIA, UNSPECIFIED HYPERLIPIDEMIA TYPE: ICD-10-CM

## 2025-05-08 DIAGNOSIS — Z00.00 ANNUAL PHYSICAL EXAM: Primary | ICD-10-CM

## 2025-05-08 DIAGNOSIS — Z13.6 ENCOUNTER FOR SCREENING FOR CARDIOVASCULAR DISORDERS: Primary | ICD-10-CM

## 2025-05-08 DIAGNOSIS — L98.9 ABNORMALITY, SKIN: ICD-10-CM

## 2025-05-08 DIAGNOSIS — I10 HTN (HYPERTENSION), BENIGN: ICD-10-CM

## 2025-05-08 DIAGNOSIS — Z00.00 ANNUAL PHYSICAL EXAM: ICD-10-CM

## 2025-05-08 LAB
ABSOLUTE EOSINOPHIL (OHS): 0.19 K/UL
ABSOLUTE MONOCYTE (OHS): 0.63 K/UL (ref 0.3–1)
ABSOLUTE NEUTROPHIL COUNT (OHS): 3.56 K/UL (ref 1.8–7.7)
ALBUMIN SERPL BCP-MCNC: 4 G/DL (ref 3.5–5.2)
ALP SERPL-CCNC: 47 UNIT/L (ref 40–150)
ALT SERPL W/O P-5'-P-CCNC: 16 UNIT/L (ref 10–44)
ANION GAP (OHS): 5 MMOL/L (ref 8–16)
AST SERPL-CCNC: 16 UNIT/L (ref 11–45)
BASOPHILS # BLD AUTO: 0.08 K/UL
BASOPHILS NFR BLD AUTO: 1.2 %
BILIRUB SERPL-MCNC: 0.6 MG/DL (ref 0.1–1)
BUN SERPL-MCNC: 21 MG/DL (ref 6–20)
CALCIUM SERPL-MCNC: 8.9 MG/DL (ref 8.7–10.5)
CHLORIDE SERPL-SCNC: 102 MMOL/L (ref 95–110)
CHOLEST SERPL-MCNC: 225 MG/DL (ref 120–199)
CHOLEST/HDLC SERPL: 6.3 {RATIO} (ref 2–5)
CO2 SERPL-SCNC: 29 MMOL/L (ref 23–29)
CREAT SERPL-MCNC: 1.4 MG/DL (ref 0.5–1.4)
EAG (OHS): 105 MG/DL (ref 68–131)
ERYTHROCYTE [DISTWIDTH] IN BLOOD BY AUTOMATED COUNT: 13.1 % (ref 11.5–14.5)
GFR SERPLBLD CREATININE-BSD FMLA CKD-EPI: >60 ML/MIN/1.73/M2
GLUCOSE SERPL-MCNC: 84 MG/DL (ref 70–110)
HBA1C MFR BLD: 5.3 % (ref 4–5.6)
HCT VFR BLD AUTO: 46.1 % (ref 40–54)
HDLC SERPL-MCNC: 36 MG/DL (ref 40–75)
HDLC SERPL: 16 % (ref 20–50)
HGB BLD-MCNC: 14.8 GM/DL (ref 14–18)
IMM GRANULOCYTES # BLD AUTO: 0.03 K/UL (ref 0–0.04)
IMM GRANULOCYTES NFR BLD AUTO: 0.4 % (ref 0–0.5)
LDLC SERPL CALC-MCNC: 144.6 MG/DL (ref 63–159)
LYMPHOCYTES # BLD AUTO: 2.39 K/UL (ref 1–4.8)
MCH RBC QN AUTO: 30.1 PG (ref 27–31)
MCHC RBC AUTO-ENTMCNC: 32.1 G/DL (ref 32–36)
MCV RBC AUTO: 94 FL (ref 82–98)
NONHDLC SERPL-MCNC: 189 MG/DL
NUCLEATED RBC (/100WBC) (OHS): 0 /100 WBC
PLATELET # BLD AUTO: 271 K/UL (ref 150–450)
PMV BLD AUTO: 10.5 FL (ref 9.2–12.9)
POTASSIUM SERPL-SCNC: 4.5 MMOL/L (ref 3.5–5.1)
PROT SERPL-MCNC: 7.5 GM/DL (ref 6–8.4)
RBC # BLD AUTO: 4.91 M/UL (ref 4.6–6.2)
RELATIVE EOSINOPHIL (OHS): 2.8 %
RELATIVE LYMPHOCYTE (OHS): 34.7 % (ref 18–48)
RELATIVE MONOCYTE (OHS): 9.2 % (ref 4–15)
RELATIVE NEUTROPHIL (OHS): 51.7 % (ref 38–73)
SODIUM SERPL-SCNC: 136 MMOL/L (ref 136–145)
TESTOST SERPL-MCNC: 436 NG/DL (ref 304–1227)
TRIGL SERPL-MCNC: 222 MG/DL (ref 30–150)
TSH SERPL-ACNC: 1.28 UIU/ML (ref 0.4–4)
WBC # BLD AUTO: 6.88 K/UL (ref 3.9–12.7)

## 2025-05-08 PROCEDURE — 99999 PR PBB SHADOW E&M-EST. PATIENT-LVL III: CPT | Mod: PBBFAC,,, | Performed by: STUDENT IN AN ORGANIZED HEALTH CARE EDUCATION/TRAINING PROGRAM

## 2025-05-08 PROCEDURE — 85025 COMPLETE CBC W/AUTO DIFF WBC: CPT

## 2025-05-08 PROCEDURE — 84443 ASSAY THYROID STIM HORMONE: CPT

## 2025-05-08 PROCEDURE — 80061 LIPID PANEL: CPT

## 2025-05-08 PROCEDURE — 99396 PREV VISIT EST AGE 40-64: CPT | Mod: S$GLB,,, | Performed by: STUDENT IN AN ORGANIZED HEALTH CARE EDUCATION/TRAINING PROGRAM

## 2025-05-08 PROCEDURE — 36415 COLL VENOUS BLD VENIPUNCTURE: CPT

## 2025-05-08 PROCEDURE — 84402 ASSAY OF FREE TESTOSTERONE: CPT

## 2025-05-08 PROCEDURE — 83036 HEMOGLOBIN GLYCOSYLATED A1C: CPT

## 2025-05-08 PROCEDURE — 84403 ASSAY OF TOTAL TESTOSTERONE: CPT

## 2025-05-08 PROCEDURE — 84075 ASSAY ALKALINE PHOSPHATASE: CPT

## 2025-05-08 NOTE — PROGRESS NOTES
SUBJECTIVE     Chief Complaint   Patient presents with    Wound Infection       HPI  Cayden Reza is a 50 y.o. male with medical diagnoses as listed in the medical history and problem list that presents for annual exam. Pt has been doing well since our last appointment.    Pt is not UTD on age appropriate CA screening. Due for PSA.    Family, social, surgical Hx reviewed     HTN: Previously on benazepril 40 mg daily, now diet controlled     HLD: Crestor 10 mg daily, tolerating without issues-recently stopped    GERD: Omeprazole prn reflux. No blood in stool.       Health Maintenance         Date Due Completion Date    TETANUS VACCINE Never done ---    Pneumococcal Vaccines (Age 50+) (1 of 2 - PCV) Never done ---    Shingles Vaccine (1 of 2) Never done ---    COVID-19 Vaccine (1 - 2024-25 season) Never done ---    Influenza Vaccine (Season Ended) 09/01/2025 ---    Colorectal Cancer Screening 09/26/2025 9/26/2024    Hemoglobin A1c (Diabetic Prevention Screening) 05/16/2027 5/16/2024    Lipid Panel 05/16/2029 5/16/2024    RSV Vaccine (Age 60+ and Pregnant patients) (1 - 1-dose 75+ series) 04/12/2049 ---              PAST MEDICAL HISTORY:  Past Medical History:   Diagnosis Date    HTN (hypertension), benign 05/16/2024    Staph infection     Synovitis of hip     right       PAST SURGICAL HISTORY:  Past Surgical History:   Procedure Laterality Date    COLONOSCOPY N/A 09/26/2024    Procedure: COLONOSCOPY;  Surgeon: Hipolito Trammell MD;  Location: [facility] ENDOSCOPY;  Service: Endoscopy;  Laterality: N/A;    ESOPHAGOGASTRODUODENOSCOPY N/A 09/26/2024    Procedure: EGD (ESOPHAGOGASTRODUODENOSCOPY);  Surgeon: Hiploito Trammell MD;  Location: [facility] ENDOSCOPY;  Service: Endoscopy;  Laterality: N/A;  clinic pt ref by jacqui morocho np; [facility]; instr via portal; AP    HIP SURGERY      bilateral hip    JOINT REPLACEMENT      SURGICAL EXCISION OF ANAL LESION N/A 12/6/2024    Procedure: EXCISION, LESION, ANUS;   Surgeon: Lilliam Curtis MD;  Location: [facility];  Service: Colon and Rectal;  Laterality: N/A;       SOCIAL HISTORY:  Social History     Socioeconomic History    Marital status:    Tobacco Use    Smoking status: Former     Current packs/day: 0.00     Types: Cigarettes     Quit date: 1996     Years since quittin.9    Smokeless tobacco: Never   Substance and Sexual Activity    Alcohol use: Yes     Alcohol/week: 3.0 standard drinks of alcohol     Types: 3 Glasses of wine per week     Comment: socially    Drug use: No    Sexual activity: Not Currently     Partners: Female     Birth control/protection: Coitus interruptus     Social Drivers of Health     Financial Resource Strain: Low Risk  (2024)    Overall Financial Resource Strain (CARDIA)     Difficulty of Paying Living Expenses: Not very hard   Food Insecurity: No Food Insecurity (2024)    Hunger Vital Sign     Worried About Running Out of Food in the Last Year: Never true     Ran Out of Food in the Last Year: Never true   Physical Activity: Sufficiently Active (2024)    Exercise Vital Sign     Days of Exercise per Week: 3 days     Minutes of Exercise per Session: 50 min   Stress: Stress Concern Present (2024)    [employer] [place] [employer] - Occupational Stress Questionnaire     Feeling of Stress : To some extent   Housing Stability: Unknown (2024)    Housing Stability Vital Sign     Unable to Pay for Housing in the Last Year: No       FAMILY HISTORY:  Family History   Problem Relation Name Age of Onset    No Known Problems Mother      Prostate cancer Father      Ulcerative colitis Sister      Transient ischemic attack Maternal Grandmother      Pacemaker/defibrilator Maternal Grandfather      Heart disease Paternal Grandfather      Prostate cancer Maternal Uncle      Stomach cancer Neg Hx      Esophageal cancer Neg Hx      Irritable bowel syndrome Neg Hx      Crohn's disease Neg Hx      Celiac disease Neg Hx          ALLERGIES AND MEDICATIONS: updated and reviewed.  Review of patient's allergies indicates:   Allergen Reactions    Adhesive Other (See Comments)     Severe rash EKG stickers  Please use paper tape & tegaderm    Amoxicillin Nausea And Vomiting    Gabapentin Other (See Comments)     Blurry vision    Morphine Other (See Comments)     Fever    Oxycodone-acetaminophen Nausea Only     Fever  Cannot recall if he can take hydrocodone or tramadol        Current Outpatient Medications   Medication Sig Dispense Refill    acetaminophen (TYLENOL ORAL) Take by mouth as needed.      benazepriL (LOTENSIN) 20 MG tablet Take 1 tablet (20 mg total) by mouth once daily. (Patient taking differently: Take 20 mg by mouth every morning.) 90 tablet 3    MAGNESIUM ORAL Take by mouth every evening.      mometasone 0.1% (ELOCON) 0.1 % cream Apply topically once daily. 45 g 0    pantoprazole (PROTONIX) 40 MG tablet Take 1 tablet (40 mg total) by mouth 2 (two) times daily. 90 tablet 3    rosuvastatin (CRESTOR) 10 MG tablet Take 1 tablet (10 mg total) by mouth once daily. 90 tablet 3    traMADoL (ULTRAM) 50 mg tablet Take 1 tablet (50 mg total) by mouth every 6 (six) hours as needed for Pain. 20 tablet 0    UNABLE TO FIND Take by mouth every morning. Beet Root      UNABLE TO FIND Take by mouth every morning. [id number]       No current facility-administered medications for this visit.       ROS  Review of Systems   Constitutional:  Positive for malaise/fatigue. Negative for fever and weight loss.   Respiratory:  Negative for cough and shortness of breath.    Cardiovascular:  Negative for chest pain and palpitations.   Gastrointestinal:  Negative for abdominal pain, constipation, diarrhea, nausea and vomiting.   Genitourinary:  Negative for dysuria.   Musculoskeletal:  Negative for back pain and joint pain.   Skin:  Negative for rash.   Neurological:  Negative for dizziness, weakness and headaches.   Psychiatric/Behavioral:  Negative for  depression. The patient is not nervous/anxious.            OBJECTIVE     Physical Exam  Vitals:    05/08/25 0808   BP: 118/80   Pulse: 67    Body mass index is 31.9 kg/m².  Weight: 92.4 kg (203 lb 11.3 oz)         Physical Exam  HENT:      Head: Normocephalic and atraumatic.      Nose: Nose normal.      Mouth/Throat:      Mouth: Mucous membranes are moist.      Pharynx: Oropharynx is clear.   Eyes:      Extraocular Movements: Extraocular movements intact.      Conjunctiva/sclera: Conjunctivae normal.      Pupils: Pupils are equal, round, and reactive to light.   Cardiovascular:      Rate and Rhythm: Normal rate and regular rhythm.   Pulmonary:      Effort: Pulmonary effort is normal.      Breath sounds: Normal breath sounds.   Musculoskeletal:         General: No swelling. Normal range of motion.      Cervical back: Normal range of motion.      Right lower leg: No edema.      Left lower leg: No edema.   Skin:     General: Skin is warm.      Findings: No lesion or rash.   Neurological:      General: No focal deficit present.      Mental Status: He is alert and oriented to person, place, and time.      Motor: No weakness.               ASSESSMENT     50 y.o. male with     1. Abnormality, skin    2. Annual physical exam    3. Screening for diabetes mellitus    4. Fatigue, unspecified type        PLAN:     1. Annual physical exam  -     TSH; Future; Expected date: 05/08/2025  -     Lipid Panel; Future; Expected date: 05/08/2025  -     Comprehensive Metabolic Panel; Future; Expected date: 05/08/2025  -     CBC Auto Differential; Future; Expected date: 05/08/2025    2. Abnormality, skin  -     Ambulatory referral/consult to Dermatology; Future; Expected date: 05/15/2025    3. Screening for diabetes mellitus  -     Hemoglobin A1C; Future; Expected date: 05/08/2025    4. Fatigue, unspecified type  -     Testosterone, free; Future; Expected date: 05/08/2025  -     TESTOSTERONE; Future; Expected date:  05/08/2025          Discussed age and gender appropriate screenings at this visit and encouraged a healthy diet low in simple carbohydrates, and increased physical activity.  Counseled on medically appropriate vaccines based on age and current health condition.  Screening test reviewed and discussed with patient.      RTC in 1 year     Leanne Stanley MD

## 2025-05-12 LAB — W FREE TESTOSTERONE: 10.4 PG/ML

## 2025-05-13 DIAGNOSIS — E78.5 HYPERLIPIDEMIA, UNSPECIFIED HYPERLIPIDEMIA TYPE: Primary | ICD-10-CM

## 2025-05-13 NOTE — TELEPHONE ENCOUNTER
Cardiology referral in to discuss alternatives to statin therapy    For testosterone, he wouldn't qualify for testosterone replacement due to values in normal range

## 2025-05-27 ENCOUNTER — OFFICE VISIT (OUTPATIENT)
Dept: CARDIOLOGY | Facility: CLINIC | Age: 50
End: 2025-05-27
Payer: COMMERCIAL

## 2025-05-27 VITALS
HEART RATE: 65 BPM | WEIGHT: 204.81 LBS | BODY MASS INDEX: 32.15 KG/M2 | HEIGHT: 67 IN | DIASTOLIC BLOOD PRESSURE: 90 MMHG | SYSTOLIC BLOOD PRESSURE: 135 MMHG

## 2025-05-27 DIAGNOSIS — E78.5 HYPERLIPIDEMIA, UNSPECIFIED HYPERLIPIDEMIA TYPE: Primary | ICD-10-CM

## 2025-05-27 DIAGNOSIS — Z13.6 ENCOUNTER FOR SCREENING FOR CARDIOVASCULAR DISORDERS: ICD-10-CM

## 2025-05-27 DIAGNOSIS — E66.811 OBESITY (BMI 30.0-34.9): ICD-10-CM

## 2025-05-27 DIAGNOSIS — I10 HTN (HYPERTENSION), BENIGN: ICD-10-CM

## 2025-05-27 LAB
OHS QRS DURATION: 92 MS
OHS QTC CALCULATION: 403 MS

## 2025-05-27 PROCEDURE — 93000 ELECTROCARDIOGRAM COMPLETE: CPT | Mod: S$GLB,,, | Performed by: INTERNAL MEDICINE

## 2025-05-27 PROCEDURE — 99999 PR PBB SHADOW E&M-EST. PATIENT-LVL IV: CPT | Mod: PBBFAC,,, | Performed by: INTERNAL MEDICINE

## 2025-05-27 PROCEDURE — 99204 OFFICE O/P NEW MOD 45 MIN: CPT | Mod: 25,S$GLB,, | Performed by: INTERNAL MEDICINE

## 2025-05-27 NOTE — PATIENT INSTRUCTIONS
"I recommend the book, "The Obesity Code" for weight loss; it recommends intermittent fasting and avoidance of sugar, artificial sweeteners and refined carbohydrates.    "Outlive", Dr Wang    Also, here is information on a Mediterranean type diet including fish, the pesco-mediterranean diet from the American College of Cardiology:    1.  Humans are evolutionarily adapted to obtain calories and nutrients from both plant and animal food sources. Many people overconsume animal products, often-processed meats high in saturated fats and chemical additives. In contrast, while strict veganism has gained popularity for many reasons and has value in certain groups, it can cause nutritional deficiencies (vitamin B12, high-quality proteins, iron, zinc, omega-3 fatty acid, vitamin D, and calcium), and predispose to osteopenia, loss of muscle mass, and anemia. This is not true of a lacto-ovo vegetarian diet, which allows no animal-based food except for eggs and dairy. A 6-year study of [id number] [place] [employer] reported a decreased incidence of all-cause mortality when comparing vegetarians with nonvegetarians. However, when the vegetarians were stratified into vegans, lacto-ovo vegetarians, pesco-vegetarians, and semi-vegetarians, the pesco-vegetarians had lowest risks for all-cause mortality, cardiovascular disease (CVD) mortality, and mortality from other causes.     2.  The authors propose a plant-rich diet rich in nuts with fish and seafood as the principle source of animal food. Known as the Pesco-Mediterranean diet, it is supplemented with extra-virgin olive oil (EVOO), which is the principle fat source, along with moderate amounts of dairy (particularly yogurt and cheese) and eggs, as well as modest amounts of alcohol consumption (ideally red wine with the evening meal), but few red and processed meats.     3.  Both epidemiological studies and randomized clinical trials indicate that the traditional Mediterranean " diet is associated with lower risks for all-cause and CVD mortality, coronary heart disease, metabolic syndrome, diabetes, cognitive decline, neurodegenerative diseases (including Alzheimers), depression, overall cancer mortality, and breast and colorectal cancers.     4.  The traditional Mediterranean diet has been endorsed in the most recent Dietary Guidelines for Americans and the American College of Cardiology/American Heart Association guidelines. The 2020 [employer] ranked it #1 for overall health based upon it being nutritious, safe, relatively easy to follow, protective against CVD and diabetes, and effective for weight loss.     5.  Fish and seafood are important sources of vitamins protein and omega-3 fatty acids, of which the higher blood and adipose tissue are associated with reduced fatal and nonfatal myocardial infarction. When not fried, fish consumption has been associated with reduced risk of heart failure, and the incidence of the metabolic syndrome, coronary heart disease, ischemic stroke, and sudden cardiac death, particularly when seafood replaces less healthy foods.     6.  Unrestricted use of olive oil in the kitchen, on salads (with vinegar), cooking vegetables, and at the table is the foundation of the traditional Mediterranean diet, although olive oil quality is crucial, which makes it expensive. EVOO retains hydrophilic components of olives including highly bioactive polyphenols, which are believed to underlie many of EVOOs cardiometabolic benefits, such as reduced low-density lipoprotein cholesterol (LDL-C) and increased high-density lipoprotein cholesterol (HDL-C), improved vascular reactivity, enhanced HDL particle functionality, and a lower diabetes risk.     7.  Tree nuts, an integral component of the traditional Mediterranean diet, are nutrient dense rich in unsaturated fats, fiber, protein, polyphenols, phytosterols, and tocopherols. Nut consumption is associated  with decreased incidence and mortality rates from both CVD and coronary artery disease (CAD), as well as atrial fibrillation and diabetes. Randomized controlled trials have shown that diets enriched with nuts produce cardiometabolic benefits including improvements in insulin sensitivity, LDL-C, inflammation, and vascular reactivity. A 1-daily serving of mixed nuts resulted in a 28% reduction in CVD risk. Generous intake of nuts does not promote weight gain because of increased satiety and incomplete digestion.     8.  Legumes are an excellent source of vegetable protein, folate, and magnesium and fiber, and like other seeds including peanuts, are rich in polyphenols. Consumption of legumes has been linked to a reduced risk of incident and fatal CVD and CAD, as well as improvements in blood glucose, cholesterol, blood pressure, and body weight. Legumes, like fish, are a satiating and healthy substitute for red meat and processed meats.     9.  Dairy products and eggs are important sources of protein, nonsodium minerals, probiotics, and vitamin D. Although there is no clear consensus among nutrition experts on the role of dairy products in CVD risk, they are allowed in this Pesco-Mediterranean diet. Fermented low-fat versions, such as yogurt and soft cheeses, are preferred; butter and hard cheese are high in saturated fats and salt.     10.  Eggs are composed of beneficial nutrients including all essential amino acids, in addition to minerals (selenium, phosphorus, iodine, zinc), vitamins (A, D, B2, B12, niacin), and carotenoids (lutein, zeaxanthin). Although each yolk contains about 184 mg of dietary cholesterol, large prospective cohorts suggest that egg consumption is unrelated to serum cholesterol and does not increase CVD risk. Eggs are allowed in the Pesco-Mediterranean diet; egg whites are unlimited and preferably no more than 5 yolks/week.     11.  Whole grains, such as barley, whole oats, rye, corn,  buckwheat, brown rice, and quinoa, are an integral part of the traditional Mediterranean diet. Pasta is an example of a starchy food that has a low glycemic index despite being a refined carbohydrate. In the context of a low glycemic index dietary pattern such as the Mediterranean diet, pasta does not adversely affect adiposity and may even help reduce body weight and there is no evidence that pasta promotes cardiometabolic risk factors. White rice is associated with type 2 diabetes mellitus in Asians but not in Western cohorts, possibly because it is cooked and served plain in [place] and in Western cultures cooked in mixed dishes with vegetables and vegetable oil including EVOO.     12.  The staple beverage of the Pesco-Mediterranean diet is water--which can be flavored but not sweetened. Unsweetened tea and coffee are rich in antioxidants and are associated with improved CVD outcomes. If alcohol is consumed at all, dry red wine is recommended, with the ideal amount being a single glass (6 oz) for women and 1 or 2 glasses/day for men (6-12 oz) consumed with meals.     13.  Time-restricted eating, a type of intermittent fasting, is the practice of limiting the daily intake of calories to a window of time usually between 6-12 hours each day. Intermittent fasting when done on a regular basis has been shown to decrease intra-abdominal adipose tissue and reduce free-radical production. This elicits powerful cellular responses that improve glucose metabolism and reduce systemic inflammation, and may also reduce risks of diabetes, CVD, cancer, and neurodegenerative diseases. After a 12-hour overnight fast, insulin levels are typically low, and glycogen stores have been depleted. In this fasted state, the body starts mobilizing fatty acids from adipose cells to burn as metabolic fuel instead of glucose. This improves insulin sensitivity. Time-restricted eating is not more effective for weight loss than standard  calorie-restriction, but appears to enhance CV health even in nonobese people. Fasting may also lower blood pressure and resting heart rate and improve autonomic balance with augmented heart rate variability.     14.  The evidence regarding time-restricted eating is mostly based on animal models and observational human studies. The most popular form of time-restricted eating involves eating two rather than three meals and compressing the calorie-consumption window. No head-to-head studies have been performed to assess the optimal time window.

## 2025-05-27 NOTE — PROGRESS NOTES
Subjective:   05/27/2025     Patient ID:  Cayden Reza is a 50 y.o. male who presents for evaulation of Hypertension and Hyperlipidemia       History of Present Illness    CHIEF COMPLAINT:  Patient presents for follow-up to discuss management of HTN and HLD after self-discontinuing medications due to side effects.    HPI:  Patient, a 50-year-old male, reports being prescribed HTN medication and a statin by Dr. Stanley. He had some success in controlling BP, although it is elevated today. He experienced stomach discomfort with both medications, and the statin was causing erectile dysfunction. He decided to stop taking both medications independently. He states that BP seemed to have improved, although he acknowledges it is elevated today.    He exercises between 2 and 5 times per week. He mentions having had a stomach-related issue, which led to him being prescribed Protonix for probable reflux. He describes his diet as inconsistent at times.    He expresses a desire to minimize medication use and is willing to make lifestyle changes to achieve this goal. He denies chest pain, tightness, squeezing, or pressure in the chest during exercise. He denies a history of DM and current medication use.    CARDIAC HISTORY:  EKG 05/27/2025: normal    MEDICATIONS:  Discontinued hypertension medicine, reason: causing stomach discomfort  Discontinued statin, reasons: causing stomach discomfort and potential erectile dysfunction Patient is on Protonix for reflux and stomach issues.    TEST RESULTS:  Patient's lipid panel shows an LDL of 145, high triglycerides, and low HDL.    MEDICAL HISTORY:  Patient has a history of hypertension and is on medication for it. He also has hyperlipidemia and is on a statin. He has GERD and is currently on Protonix. Patient has a history of smoking but has achieved long-term cessation.    FAMILY HISTORY:  Grandmother: high cholesterol Family history is significant for a grandfather with  a possible history of myocardial infarction, who lived to 98 years old. It's not specified whether this grandfather is paternal or maternal. Patient's maternal grandfather also lived to 98 years old. His father is still alive.    SOCIAL HISTORY:  Smoking: Former smoker, long-term cessation  Alcohol: Occasional drinker, consumed alcohol on Memorial Day      ROS:  General: -fever, -chills, -fatigue, -weight gain, -weight loss  Eyes: -vision changes, -redness, -discharge  ENT: -ear pain, -nasal congestion, -sore throat  Cardiovascular: -chest pain, -palpitations, -lower extremity edema  Respiratory: -cough, -shortness of breath  Gastrointestinal: +abdominal pain, -nausea, -vomiting, -diarrhea, -constipation, -blood in stool, +indigestion  Genitourinary: -dysuria, -hematuria, -frequency  Musculoskeletal: -joint pain, -muscle pain  Skin: -rash, -lesion  Neurological: -headache, -dizziness, -numbness, -tingling  Psychiatric: -anxiety, -depression, -sleep difficulty  Male Genitourinary: +erectile dysfunction        The 10-year ASCVD risk score (Sallie KING, et al., 2019) is: 6.8%    Values used to calculate the score:      Age: 50 years      Sex: Male      Is Non- : No      Diabetic: No      Tobacco smoker: No      Systolic Blood Pressure: 135 mmHg      Is BP treated: No      HDL Cholesterol: 36 mg/dL      Total Cholesterol: 225 mg/dL    Problem List[1]     Review of patient's allergies indicates:   Allergen Reactions    Adhesive Other (See Comments)     Severe rash EKG stickers  Please use paper tape & tegaderm    Amoxicillin Nausea And Vomiting    Gabapentin Other (See Comments)     Blurry vision    Morphine Other (See Comments)     Fever    Oxycodone-acetaminophen Nausea Only     Fever  Cannot recall if he can take hydrocodone or tramadol          Current Medications[2]     Objective:   Physical Exam    Vitals: Weight: 93 kilograms.  General: No acute distress. Well-developed.  Well-nourished.  Eyes: EOMI. Sclerae anicteric.  HENT: Normocephalic. Atraumatic. Nares patent. Moist oral mucosa.  Cardiovascular: Regular rate. Regular rhythm. No murmurs. No rubs. No gallops. Normal S1, S2.  Respiratory: Normal respiratory effort. Clear to auscultation bilaterally. No rales. No rhonchi. No wheezing.  Musculoskeletal: No  obvious deformity.  Extremities: No lower extremity edema.  Neurological: Alert & oriented x3. No slurred speech. Normal gait.  Psychiatric: Normal mood. Normal affect. Good insight. Good judgment.  Skin: Warm. Dry. No rash.          Vitals:    05/27/25 0855   BP: (!) 135/90   Pulse: 65     Wt Readings from Last 3 Encounters:   05/27/25 92.9 kg (204 lb 12.9 oz)   05/08/25 92.4 kg (203 lb 11.3 oz)   12/06/24 89.8 kg (198 lb)     Temp Readings from Last 3 Encounters:   12/06/24 98 °F (36.7 °C)   12/02/24 97.8 °F (36.6 °C) (Temporal)   09/26/24 97.9 °F (36.6 °C) (Temporal)     BP Readings from Last 3 Encounters:   05/27/25 (!) 135/90   05/08/25 118/80   12/06/24 120/78     Pulse Readings from Last 3 Encounters:   05/27/25 65   05/08/25 67   12/06/24 72               Lab Results   Component Value Date    CHOL 225 (H) 05/08/2025    CHOL 228 (H) 05/16/2024     Lab Results   Component Value Date    HDL 36 (L) 05/08/2025    HDL 54 05/16/2024     Lab Results   Component Value Date    LDLCALC 144.6 05/08/2025    LDLCALC 142.2 05/16/2024     Lab Results   Component Value Date    ALT 16 05/08/2025    AST 16 05/08/2025    AST 18 09/13/2024    AST 26 05/16/2024     Lab Results   Component Value Date    CREATININE 1.4 05/08/2025    BUN 21 (H) 05/08/2025     05/08/2025    K 4.5 05/08/2025    CO2 29 05/08/2025    CO2 26 09/13/2024    CO2 28 05/16/2024     Lab Results   Component Value Date    HGB 14.8 05/08/2025    HCT 46.1 05/08/2025    HCT 46.5 09/13/2024    HCT 47.0 05/16/2024       Assessment and Plan:   Assessment & Plan    E78.5 Hyperlipidemia, unspecified hyperlipidemia type  I10 HTN  (hypertension), benign  Z13.6 Encounter for screening for cardiovascular disorders    IMPRESSION:  - Calculated 10-year risk of heart attack at 6.8% based on current cholesterol levels.  - Opted for lifestyle modifications as primary intervention.    HYPERLIPIDEMIA, UNSPECIFIED HYPERLIPIDEMIA TYPE:  - Explained relationship between inflammation, abdominal obesity, and cardiovascular risk.  - Patient to lose weight with focus on reducing abdominal obesity, continue regular exercise routine (2-5 times per week), reduce sugar and artificial sweetener intake.  - Discussed benefits of reducing sugar and artificial sweetener intake.  - Consider implementation of intermittent fasting.    HTN (HYPERTENSION), BENIGN:  - Explained relationship between inflammation, abdominal obesity, and cardiovascular risk.  - Patient to lose weight with focus on reducing abdominal obesity, continue regular exercise routine (2-5 times per week), reduce sugar and artificial sweetener intake.  - Consider implementation of intermittent fasting.    ENCOUNTER FOR SCREENING FOR CARDIOVASCULAR DISORDERS:  - Explained relationship between inflammation, abdominal obesity, and cardiovascular risk.  - Ordered calcium score test to further assess cardiovascular risk.  - Follow up to schedule calcium score test.        Calcium score to be obtained to further assess cardiac risk.  Currently with a 10 year risk of 6.8%, could avoid statin therapy.  Patient should certainly lose weight and exercise.  He is already trying to do this.  Is triglycerides elevated, those could certainly be reduced.  His family history does not appear to be positive for coronary artery disease.  There is a great deal of longevity in his family.  Will await calcium score.  No follow-ups on file.        Future Appointments   Date Time Provider Department Center   6/5/2025  8:00 AM [facility]  CT1 [facility] [place]   9/5/2025  8:20 AM Pebbles Reynolds MD [facility] N[facility] [place]       This  note was generated with the assistance of ambient listening technology. Verbal consent was obtained by the patient and accompanying visitor(s) for the recording of patient appointment to facilitate this note. I attest to having reviewed and edited the generated note for accuracy, though some syntax or spelling errors may persist. Please contact the author of this note for any clarification.                      [1]   Patient Active Problem List  Diagnosis    Right hip pain    Lumbar pain with radiation down left leg    Difficulty walking    HTN (hypertension), benign    Hyperlipidemia    Gastroesophageal reflux disease    Anal lesion    Obesity (BMI 30.0-34.9)   [2]   Current Outpatient Medications:     acetaminophen (TYLENOL ORAL), Take by mouth as needed., Disp: , Rfl:     MAGNESIUM ORAL, Take by mouth every evening., Disp: , Rfl:     mometasone 0.1% (ELOCON) 0.1 % cream, Apply topically once daily., Disp: 45 g, Rfl: 0    pantoprazole (PROTONIX) 40 MG tablet, Take 1 tablet (40 mg total) by mouth 2 (two) times daily., Disp: 90 tablet, Rfl: 3    UNABLE TO FIND, Take by mouth every morning. Beet Root, Disp: , Rfl:     rosuvastatin (CRESTOR) 10 MG tablet, Take 1 tablet (10 mg total) by mouth once daily. (Patient not taking: Reported on 5/27/2025), Disp: 90 tablet, Rfl: 3    traMADoL (ULTRAM) 50 mg tablet, Take 1 tablet (50 mg total) by mouth every 6 (six) hours as needed for Pain. (Patient not taking: Reported on 5/27/2025), Disp: 20 tablet, Rfl: 0    UNABLE TO FIND, Take by mouth every morning. [id number] (Patient not taking: Reported on 5/27/2025), Disp: , Rfl:

## 2025-06-04 ENCOUNTER — TELEPHONE (OUTPATIENT)
Dept: CARDIOLOGY | Facility: CLINIC | Age: 50
End: 2025-06-04
Payer: COMMERCIAL

## 2025-06-20 ENCOUNTER — PATIENT MESSAGE (OUTPATIENT)
Dept: PRIMARY CARE CLINIC | Facility: CLINIC | Age: 50
End: 2025-06-20
Payer: COMMERCIAL

## 2025-07-08 DIAGNOSIS — K21.9 GASTROESOPHAGEAL REFLUX DISEASE WITHOUT ESOPHAGITIS: ICD-10-CM

## 2025-07-08 RX ORDER — PANTOPRAZOLE SODIUM 40 MG/1
40 TABLET, DELAYED RELEASE ORAL 2 TIMES DAILY
Qty: 90 TABLET | Refills: 3 | Status: SHIPPED | OUTPATIENT
Start: 2025-07-08 | End: 2026-01-04

## 2025-07-23 ENCOUNTER — OFFICE VISIT (OUTPATIENT)
Dept: URGENT CARE | Facility: CLINIC | Age: 50
End: 2025-07-23
Payer: COMMERCIAL

## 2025-07-23 VITALS
DIASTOLIC BLOOD PRESSURE: 87 MMHG | SYSTOLIC BLOOD PRESSURE: 135 MMHG | HEIGHT: 67 IN | WEIGHT: 204 LBS | OXYGEN SATURATION: 98 % | RESPIRATION RATE: 18 BRPM | BODY MASS INDEX: 32.02 KG/M2 | HEART RATE: 67 BPM | TEMPERATURE: 98 F
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
## 2025-07-23 DIAGNOSIS — S61.451A CAT BITE OF RIGHT HAND, INITIAL ENCOUNTER: Primary | ICD-10-CM

## 2025-07-23 DIAGNOSIS — H65.90 FLUID COLLECTION OF MIDDLE EAR: ICD-10-CM

## 2025-07-23 PROCEDURE — 99213 OFFICE O/P EST LOW 20 MIN: CPT | Mod: S$GLB,,,

## 2025-07-23 RX ORDER — AMOXICILLIN AND CLAVULANATE POTASSIUM 875; 125 MG/1; MG/1
1 TABLET, FILM COATED ORAL EVERY 12 HOURS
Qty: 14 TABLET | Refills: 0 | Status: SHIPPED | OUTPATIENT
Start: 2025-07-23 | End: 2025-07-30

## 2025-07-23 NOTE — PROGRESS NOTES
"Subjective:      Patient ID: Cayden Reza is a 50 y.o. male.    Vitals:  height is 5' 7" (1.702 m) and weight is 92.5 kg (204 lb). His oral temperature is 98 °F (36.7 °C). His blood pressure is 135/87 and his pulse is 67. His respiration is 18 and oxygen saturation is 98%.     Chief Complaint: Animal Bite    This is a 50 y.o. male who presents today with a chief complaint of  Rt hand ring finger cat scratch/bite x yesterday. Bilateral ear pressure x 2 days. Patient states he was trying to save a stray cat on side of the road when it scratched and bit him. He states that he does have the cat at his house. He has not noticed any unusual behaviors. No foaming or drooling at the mouth.     Animal Bite   The incident occurred yesterday. There is an injury to the Right ring finger. Pertinent negatives include no chest pain, no fussiness, no numbness, no visual disturbance, no abdominal pain, no bowel incontinence, no nausea, no vomiting, no bladder incontinence, no headaches, no hearing loss, no inability to bear weight, no neck pain, no pain when bearing weight, no focal weakness, no decreased responsiveness, no light-headedness, no loss of consciousness, no seizures, no tingling, no weakness, no cough, no difficulty breathing and no memory loss. His tetanus status is unknown.     HENT:  Negative for hearing loss.    Neck: Negative for neck pain.   Cardiovascular:  Negative for chest pain.   Respiratory:  Negative for cough.    Gastrointestinal:  Negative for abdominal pain, nausea, vomiting and bowel incontinence.   Genitourinary:  Negative for bladder incontinence.   Skin:  Positive for wound, puncture wound and erythema.   Neurological:  Negative for light-headedness, focal weakness, headaches, loss of consciousness, numbness and seizures.      Objective:     Physical Exam   Constitutional: He is oriented to person, place, and time. He appears well-developed.   HENT:   Head: Normocephalic and " atraumatic. Head is without abrasion, without contusion and without laceration.   Ears:   Right Ear: External ear normal. A middle ear effusion is present.   Left Ear: External ear normal.   Nose: Nose normal.   Mouth/Throat: Oropharynx is clear and moist and mucous membranes are normal.   Eyes: Conjunctivae, EOM and lids are normal. Pupils are equal, round, and reactive to light.   Neck: Trachea normal and phonation normal. Neck supple.   Cardiovascular: Normal rate, regular rhythm and normal heart sounds.   Pulmonary/Chest: Effort normal and breath sounds normal. No stridor. No respiratory distress.   Musculoskeletal: Normal range of motion.         General: Normal range of motion.        Hands:    Neurological: He is alert and oriented to person, place, and time.   Skin: Skin is warm, dry, intact and no rash. Capillary refill takes less than 2 seconds. erythema No abrasion, No burn, No bruising and No ecchymosis   Psychiatric: His speech is normal and behavior is normal. Judgment and thought content normal.   Nursing note and vitals reviewed.        Assessment:     1. Cat bite of right hand, initial encounter    2. Fluid collection of middle ear        Plan:       Cat bite of right hand, initial encounter  -     amoxicillin-clavulanate 875-125mg (AUGMENTIN) 875-125 mg per tablet; Take 1 tablet by mouth every 12 (twelve) hours. for 7 days  Dispense: 14 tablet; Refill: 0    Fluid collection of middle ear                Patient Instructions   - You have been given an antibiotic to treat your condition today.    - Make sure to take antibiotic with food.   - Please complete the antibiotic as directed on the bottle.   - you can take over-the-counter probiotics during and after antibiotic use to help preserve gut lele and reduce gastrointestinal symptoms. Make sure to take probiotic 1-2 hours before or after antibiotic to get greatest benefits.     - Start Flonase nasal spray to help with fluid behind the ears.     -  Monitor the cat. If it starts toe exhibit signs of rabies such as, behavioral changes, seizures, drooling, uncharacteristic aggression, paralysis, lack of coordination, difficulty breathing, abnormal behavior go to the emergency room for a rabies vaccine.     - Rest.    - Drink plenty of fluids.    - Acetaminophen (tylenol) or Ibuprofen (advil,motrin) as directed as needed for fever/pain. Avoid tylenol if you have a history of liver disease. Do not take ibuprofen if you have a history of GI bleeding, kidney disease, or if you take blood thinners.   - Ibuprofen dosing for adults: 400 mg by mouth every 4-6 hours as needed. Max: 2400 mg/day; Info: use lowest effective dose, shortest effective treatment duration; give w/ food if GI upset occurs.  - Tylenol dosing for adults: [By mouth route, immediate-release form] Dose: 325-1000 mg by mouth every 4-6h as needed; Max: 1 g/4h and 4 g/day from all sources. [By mouth route, extended-release form] Dose: 650-1300 mg Extended Release by mouth every 8h as needed; Max: 4 g/day from all sources.     - You must understand that you have received an Urgent Care treatment only and that you may be released before all of your medical problems are known or treated.   - You, the patient, will arrange for follow up care as instructed.   - If your condition worsens or fails to improve we recommend that you receive another evaluation at the ER immediately or contact your PCP to discuss your concerns or return here.   - Follow up with your PCP or specialty clinic as directed in the next 1-2 weeks if not improved or as needed.  You can call [phone] to schedule an appointment with the appropriate provider.    If your symptoms do not improve or worsen, go to the emergency room immediately.

## 2025-07-23 NOTE — PATIENT INSTRUCTIONS
- You have been given an antibiotic to treat your condition today.    - Make sure to take antibiotic with food.   - Please complete the antibiotic as directed on the bottle.   - you can take over-the-counter probiotics during and after antibiotic use to help preserve gut lele and reduce gastrointestinal symptoms. Make sure to take probiotic 1-2 hours before or after antibiotic to get greatest benefits.     - Start Flonase nasal spray to help with fluid behind the ears.     - Monitor the cat. If it starts toe exhibit signs of rabies such as, behavioral changes, seizures, drooling, uncharacteristic aggression, paralysis, lack of coordination, difficulty breathing, abnormal behavior go to the emergency room for a rabies vaccine.     - Rest.    - Drink plenty of fluids.    - Acetaminophen (tylenol) or Ibuprofen (advil,motrin) as directed as needed for fever/pain. Avoid tylenol if you have a history of liver disease. Do not take ibuprofen if you have a history of GI bleeding, kidney disease, or if you take blood thinners.   - Ibuprofen dosing for adults: 400 mg by mouth every 4-6 hours as needed. Max: 2400 mg/day; Info: use lowest effective dose, shortest effective treatment duration; give w/ food if GI upset occurs.  - Tylenol dosing for adults: [By mouth route, immediate-release form] Dose: 325-1000 mg by mouth every 4-6h as needed; Max: 1 g/4h and 4 g/day from all sources. [By mouth route, extended-release form] Dose: 650-1300 mg Extended Release by mouth every 8h as needed; Max: 4 g/day from all sources.     - You must understand that you have received an Urgent Care treatment only and that you may be released before all of your medical problems are known or treated.   - You, the patient, will arrange for follow up care as instructed.   - If your condition worsens or fails to improve we recommend that you receive another evaluation at the ER immediately or contact your PCP to discuss your concerns or return here.    - Follow up with your PCP or specialty clinic as directed in the next 1-2 weeks if not improved or as needed.  You can call [phone] to schedule an appointment with the appropriate provider.    If your symptoms do not improve or worsen, go to the emergency room immediately.

## 2025-09-05 ENCOUNTER — OFFICE VISIT (OUTPATIENT)
Dept: GASTROENTEROLOGY | Facility: CLINIC | Age: 50
End: 2025-09-05
Payer: COMMERCIAL

## 2025-09-05 VITALS
BODY MASS INDEX: 32.18 KG/M2 | WEIGHT: 205 LBS | SYSTOLIC BLOOD PRESSURE: 147 MMHG | HEIGHT: 67 IN | HEART RATE: 59 BPM | DIASTOLIC BLOOD PRESSURE: 93 MMHG

## 2025-09-05 DIAGNOSIS — R19.7 DIARRHEA, UNSPECIFIED TYPE: Primary | ICD-10-CM

## 2025-09-05 DIAGNOSIS — K21.9 GASTROESOPHAGEAL REFLUX DISEASE WITHOUT ESOPHAGITIS: ICD-10-CM

## 2025-09-05 DIAGNOSIS — Z79.899 LONG-TERM CURRENT USE OF PROTON PUMP INHIBITOR THERAPY: ICD-10-CM

## 2025-09-05 DIAGNOSIS — Z12.11 COLON CANCER SCREENING: ICD-10-CM

## 2025-09-05 PROCEDURE — 99999 PR PBB SHADOW E&M-EST. PATIENT-LVL III: CPT | Mod: PBBFAC,,,

## 2025-09-05 RX ORDER — DICYCLOMINE HYDROCHLORIDE 10 MG/1
10 CAPSULE ORAL
Qty: 120 CAPSULE | Refills: 1 | Status: SHIPPED | OUTPATIENT
Start: 2025-09-05

## 2025-09-05 RX ORDER — PANTOPRAZOLE SODIUM 40 MG/1
40 TABLET, DELAYED RELEASE ORAL DAILY
Qty: 90 TABLET | Refills: 1 | Status: SHIPPED | OUTPATIENT
Start: 2025-09-05 | End: 2026-03-04

## (undated) DEVICE — DRAPE THREE-QTR REINF 53X77IN

## (undated) DEVICE — TAPE SILK 3IN

## (undated) DEVICE — JELLY SURGILUBE LUBE TUBE 2OZ

## (undated) DEVICE — TIP YANKAUERS BULB NO VENT

## (undated) DEVICE — GLOVE SENSICARE PI GRN 6.5

## (undated) DEVICE — ELECTRODE REM PLYHSV RETURN 9

## (undated) DEVICE — DRAPE UND BUTT W/POUCH 40X44IN

## (undated) DEVICE — SPONGE COTTON TRAY 4X4IN

## (undated) DEVICE — SPONGE BULKEE II ABSRB 6X6.75

## (undated) DEVICE — DRAPE LEGGINGS CUFF 33X51IN

## (undated) DEVICE — GLOVE SENSICARE PI SURG 6

## (undated) DEVICE — PAD PREP CUFFED NS 24X48IN

## (undated) DEVICE — SOL POVIDONE PREP IODINE 4 OZ

## (undated) DEVICE — Device